# Patient Record
Sex: MALE | Race: WHITE | Employment: FULL TIME | URBAN - METROPOLITAN AREA
[De-identification: names, ages, dates, MRNs, and addresses within clinical notes are randomized per-mention and may not be internally consistent; named-entity substitution may affect disease eponyms.]

---

## 2020-02-26 ENCOUNTER — HOSPITAL ENCOUNTER (OUTPATIENT)
Dept: PHYSICAL THERAPY | Age: 30
Discharge: HOME | End: 2020-02-26
Payer: OTHER GOVERNMENT

## 2020-02-26 PROCEDURE — 97110 THERAPEUTIC EXERCISES: CPT | Performed by: PHYSICAL THERAPIST

## 2020-02-26 PROCEDURE — 97014 ELECTRIC STIMULATION THERAPY: CPT | Performed by: PHYSICAL THERAPIST

## 2020-02-26 PROCEDURE — 97161 PT EVAL LOW COMPLEX 20 MIN: CPT | Performed by: PHYSICAL THERAPIST

## 2020-02-26 NOTE — PROGRESS NOTES
PT INITIAL EVALUATION NOTE 2-15    Patient Name: Lois Golden  Date:2020  : 1990  [x]  Patient  Verified  Payor:  / Plan: [employer] [facility] [address] AND DEPENDENTS / Product Type:  /    In time:3:20 pm  Out time:4:30 pm  Total Treatment Time (min): 70  Visit #: 1     Treatment Area: Left leg pain [M79.605]    SUBJECTIVE  Pain Level (0-10 scale): 2/10  Any medication changes, allergies to medications, adverse drug reactions, diagnosis change, or new procedure performed?: [] No    [x] Yes (see summary sheet for update)  Subjective:     Playing soccer more than 1 year ago there was a partial ACL tear from trying to change directions. 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 It would give out going down stairs. He had a [facility]G ACL[facility]R 2020 at [facility] and is doing well per his report. He is only taking OTC NSAID and isn't taking pain medication any longer. Has been icing and WBAT. He is back to working because he works from home. He is able to sit on his computer.   PLOF: soccer, weight training, active lifestyle, [employer]ang[employer]  Mechanism of Injury: soccer  Previous Treatment/Compliance: strengthening at home  PMHx/Surgical Hx: no sig PMH per pt report  Work Hx: sales for [employer]  Living Situation: with wife in home with stairs  Pt Goals: to return to soccer  Barriers: none noted  Motivation: excellent  Substance use: none stated    FABQ Score: see FOTO   Cognition: A & O x 4        OBJECTIVE/EXAMINATION  Posture:  Stooped over crutches  Other Observations:  Bruising and edema L knee WNL for stage of condition 6 days s/p  Gait and Functional Mobility:  Brace L locked in ext; bilateral axillary crutches  Palpation: intracapsular and interstitial edema      AROM:         R   L  Flexion /DF   140   53  Extension/PF   0   0        MMT: quad tone poor on L, HS weakness noted  Neurological: Reflexes / Sensations: WNL sensation with some numbness around incision L knee  Special Tests:    Deferred secondary to surgery      Modality rationale: decrease edema, decrease inflammation, decrease pain, increase tissue extensibility and increase muscle contraction/control to improve the patients ability to ambulate without AD   Min Type Additional Details   15 [x] Estim: []Att   [x]Unatt        []TENS instruct                  [x]IFC  []Premod   [x]NMES                     []Other:  []w/US   [x]w/ice   []w/heat  Position: NMES to L quad for QS and IFC to L knee when icing  Location: L knee    []  Traction: [] Cervical       []Lumbar                       [] Prone          []Supine                       []Intermittent   []Continuous Lbs:  [] before manual  [] after manual  []w/heat    []  Ultrasound: []Continuous   [] Pulsed at:                            []1MHz   []3MHz Location:  W/cm2:    []  Paraffin         Location:  []w/heat    []  Ice     []  Heat  []  Ice massage Position:  Location:    []  Laser  []  Other: Position:  Location:    []  Vasopneumatic Device Pressure:       [] lo [] med [] hi   Temperature:    [x] Skin assessment post-treatment:  [x]intact [x]redness- no adverse reaction    []redness - adverse reaction:     15 min Therapeutic Exercise:  [x] See flow sheet :   Rationale: increase ROM, increase strength, improve coordination, improve balance and increase proprioception to improve the patients ability to return to N ambulation             With   [x] TE   [] TA   [] neuro   [] other: Patient Education: [x] Review HEP    [x] Progressed/Changed HEP based on:   [] positioning   [] body mechanics   [] transfers   [] heat/ice application    [] other:        Other Objective/Functional Measures:see FOTO     Pain Level (0-10 scale) post treatment: 1/10 following icing      ASSESSMENT:      [x]  See Plan of Care      Kailey Gonzalez, PT, DPT, [facility]  2/26/2020

## 2020-02-26 NOTE — PROGRESS NOTES
[address] [facility] [address]  Phone: [phone]  Fax: [phone]    Plan of Care/Statement of Necessity for Physical Therapy Services  2-15    Patient name: Oliverio Stpaleton  : 1990  Provider#: [id number]  Referral source: Sofía Herring MD      Medical/Treatment Diagnosis: Left leg pain [M79.605]     Prior Hospitalization: see medical history     Comorbidities: none stated  Prior Level of Function: soccer, [employer], work in sedentary job, exercising  Medications: Verified on Patient Summary List  Start of Care: 2020      Onset Date: 2020 surgical intervention    The Plan of Care and following information is based on the information from the initial evaluation. Assessment/ key information: Tao Mcnair presents to our office s/p L ACL-R by Counts include 234 beds at the [facility] and demonstrates limited ROM, strength, gait deviation, poor proprioception and limited ADL and IADL abilities. He will benefit from skilled PT prior to D/C to address the below and allow return to N ADL skills and N IADL skills without limitation. Evaluation Complexity History LOW Complexity : Zero comorbidities / personal factors that will impact the outcome / POC; Examination MEDIUM Complexity : 3 Standardized tests and measures addressing body structure, function, activity limitation and / or participation in recreation  ;Presentation MEDIUM Complexity : Evolving with changing characteristics  ; Clinical Decision Making MEDIUM Complexity : FOTO score of 26-74  Overall Complexity Rating: LOW     Problem List: pain affecting function, decrease ROM, decrease strength, edema affecting function, impaired gait/ balance, decrease ADL/ functional abilitiies, decrease activity tolerance, decrease flexibility/ joint mobility and decrease transfer abilities   Treatment Plan may include any combination of the following: Therapeutic exercise, Therapeutic activities, Neuromuscular re-education, Physical agent/modality, Gait/balance training, Manual therapy, Patient education, Functional mobility training and Stair training  Patient / Family readiness to learn indicated by: asking questions, trying to perform skills and interest  Persons(s) to be included in education: patient (P) and family support person (FSP);list wife  Barriers to Learning/Limitations: None  Patient Goal (s): to get back to soccer  Patient Self Reported Health Status: excellent  Rehabilitation Potential: excellent    Short Term Goals: To be accomplished in 8 treatments:  Pt will demo a min of 90 degrees of L knee flex  Pt will ambulate with brace and no AD without LOB for 200 ft  Pt will demo flex SLR with no quad lag to demonstrate strength to allow for ambulation without  Brace locking    Long Term Goals: To be accomplished in 24-32 treatments:  Pt will demo full Flex ROM L knee for allow for 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  Pt will demo running x a min of 10 minutes to allow for passing of [employer] 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  Long Term AR Goals  1)Patient will demonstrate Grade IV or Grade V Hruska Adduction Lift Score to allow for functional mobility in home and community settings  2)Pt will demonstrate the ability to ambulate forward and backward achieving bilateral Acetabular Femoral Internal Rotation for pain free mobility  3)Pt will demonstrate the ability to run without subjective complaints or gait deviation  4)Pt will demonstrate independence with discharge HEP to allow for ambulation, sitting and standing without objective dysfunction    Frequency / Duration: Patient to be seen 2 times per week for up to 32 treatments.     Patient/ Caregiver education and instruction: self care, activity modification, brace/ splint application and exercises    [x]  Plan of care has been reviewed with PTA        Certification Period: 90 days      Vin Elliott PT, DPT, [facility]    2/26/2020 ________________________________________________________________________    I certify that the above Therapy Services are being furnished while the patient is under my care. I agree with the treatment plan and certify that this therapy is necessary.     [de-identified] Signature:____________________  Date:____________Time: _________

## 2020-03-02 ENCOUNTER — HOSPITAL ENCOUNTER (OUTPATIENT)
Dept: PHYSICAL THERAPY | Age: 30
Discharge: HOME | End: 2020-03-02
Payer: OTHER GOVERNMENT

## 2020-03-02 PROCEDURE — 97014 ELECTRIC STIMULATION THERAPY: CPT | Performed by: PHYSICAL THERAPIST

## 2020-03-02 PROCEDURE — 97016 VASOPNEUMATIC DEVICE THERAPY: CPT | Performed by: PHYSICAL THERAPIST

## 2020-03-02 PROCEDURE — 97110 THERAPEUTIC EXERCISES: CPT | Performed by: PHYSICAL THERAPIST

## 2020-03-04 ENCOUNTER — HOSPITAL ENCOUNTER (OUTPATIENT)
Dept: PHYSICAL THERAPY | Age: 30
Discharge: HOME | End: 2020-03-04
Payer: OTHER GOVERNMENT

## 2020-03-04 PROCEDURE — 97016 VASOPNEUMATIC DEVICE THERAPY: CPT | Performed by: PHYSICAL THERAPIST

## 2020-03-04 PROCEDURE — 97110 THERAPEUTIC EXERCISES: CPT | Performed by: PHYSICAL THERAPIST

## 2020-03-04 PROCEDURE — 97014 ELECTRIC STIMULATION THERAPY: CPT | Performed by: PHYSICAL THERAPIST

## 2020-03-04 NOTE — PROGRESS NOTES
PT DAILY TREATMENT NOTE 2-15    Patient Name: Lucila Sandoval  Date:3/4/2020  : 1990  [x]  Patient  Verified  Payor:  / Plan: [employer] [address] AND DEPENDENTS / Product Type:  /    In time:11:05 AM  Out time:12:00  Total Treatment Time (min): 55  Visit #:  3    Treatment Area: Left leg pain [M79.605]    SUBJECTIVE  Pain Level (0-10 scale): 0/10  Any medication changes, allergies to medications, adverse drug reactions, diagnosis change, or new procedure performed?: [x] No    [] Yes (see summary sheet for update)  Subjective functional status/changes:   [] No changes reported  Minimal pain, but continues to c/o swelling especially suprapatellar area. OBJECTIVE    Modality rationale: decrease edema, decrease inflammation and increase muscle contraction/control to improve the patients ability to perform ADLs. Min Type Additional Details       [x] Estim: []Att   [x]Unatt    []TENS instruct                  []IFC  []Premod   [x]NMES                     []Other:  []w/US   []w/ice   []w/heat  Position:  Location:       []  Traction: [] Cervical       []Lumbar                       [] Prone          []Supine                       []Intermittent   []Continuous Lbs:  [] before manual  [] after manual  []w/heat    []  Ultrasound: []Continuous   [] Pulsed                       at: []1MHz   []3MHz Location:  W/cm2:    [] Paraffin         Location:   []w/heat    []  Ice     []  Heat  []  Ice massage Position:  Location:    []  Laser  []  Other: Position:  Location:      [x]  Vasopneumatic Device Pressure:       [x] lo [] med [] hi   Temperature:      [x] Skin assessment post-treatment:  [x]intact []redness- no adverse reaction    []redness - adverse reaction:         35 min Therapeutic Exercise:  [x] See flow sheet :   Rationale: increase ROM, increase strength and improve coordination to improve the patients ability to perform ADLs.             With   [x] TE   [] TA   [] neuro   [] other: Patient Education: [x] Review HEP    [] Progressed/Changed HEP based on:   [] positioning   [] body mechanics   [] transfers   [x] heat/ice application    [x] other: swelling management     Other Objective/Functional Measures:   Knee flexion ROM: 100 degrees     Pain Level (0-10 scale) post treatment: 0/10    ASSESSMENT/Changes in Function:   Patient progressing well with quad control and ROM  Patient will continue to benefit from skilled PT services to modify and progress therapeutic interventions, address functional mobility deficits, address ROM deficits, address strength deficits, analyze and address soft tissue restrictions, analyze and cue movement patterns, analyze and modify body mechanics/ergonomics and assess and modify postural abnormalities to attain remaining goals.      []  See Plan of Care  [x]  See progress note/recertification  []  See Discharge Summary    PLAN  [x]  Upgrade activities as tolerated     []  Continue plan of care  []  Update interventions per flow sheet       []  Discharge due to:_  []  Other:_      Maye ePrez, PT 3/4/2020

## 2020-03-04 NOTE — PROGRESS NOTES
[facility]  [place] [address] [facility] [address]  Phone: [phone]      Fax:  [phone]    Progress Note    Name: Vanessa Jefferson   : 1990   MD: Isra Cantu MD       Treatment Diagnosis: Left leg pain [M79.605]  Start of Care: 2020    Visits from Start of Care: 3  Missed Visits: 0    Summary of Care: Jeanne Turpin has progressed well with therapy with a focus on a gradual progression of quad strength and ROM along phase I of rehab protocol. ROM is currently at 100 degrees of flexion and 0 degrees of extension. Quad strength is returning nicely and he demonstrates no extensor lag. Assessment / Recommendations:        Short Term Goals: To be accomplished in 8 treatments:  Pt will demo a min of 90 degrees of L knee flex. Met. Pt will ambulate with brace and no AD without LOB for 200 ft  Met. Pt will demo flex SLR with no quad lag to demonstrate strength to allow for ambulation without  Brace locking  Met. Patient will continue to benefit from PT under the current plan of care with a focus on weaning from the brace and normalizing gait.       Zaki Lozada, PT 3/4/2020    ________________________________________________________________________  NOTE TO PHYSICIAN:  Please complete the following and fax to: [facility]: [phone]  . Retain this original for your records. If you are unable to process this request in 24 hours, please contact our office.        ____ I have read the above report and request that my patient continue therapy with the following changes/special instructions:  ____ I have read the above report and request that my patient be discharged from therapy    Physician's Signature:_________________ Date:___________Time:__________

## 2020-03-09 ENCOUNTER — APPOINTMENT (OUTPATIENT)
Dept: PHYSICAL THERAPY | Age: 30
End: 2020-03-09
Payer: OTHER GOVERNMENT

## 2020-03-10 ENCOUNTER — HOSPITAL ENCOUNTER (OUTPATIENT)
Dept: PHYSICAL THERAPY | Age: 30
Discharge: HOME | End: 2020-03-10
Payer: OTHER GOVERNMENT

## 2020-03-10 PROCEDURE — 97140 MANUAL THERAPY 1/> REGIONS: CPT | Performed by: PHYSICAL THERAPIST

## 2020-03-10 PROCEDURE — 97016 VASOPNEUMATIC DEVICE THERAPY: CPT | Performed by: PHYSICAL THERAPIST

## 2020-03-10 PROCEDURE — 97110 THERAPEUTIC EXERCISES: CPT | Performed by: PHYSICAL THERAPIST

## 2020-03-10 NOTE — PROGRESS NOTES
PT DAILY TREATMENT NOTE 2-15    Patient Name: Gracy Younger  Date:3/10/2020  : 1990  [x]  Patient  Verified  Payor:  / Plan: [employer] [facility] [address] AND DEPENDENTS / Product Type:  /    In time: 4:20 PM  Out time: 5:25 PM  Total Treatment Time (min): 65  Visit #:  4    Treatment Area: Left leg pain [M79.347]    SUBJECTIVE  Pain Level (0-10 scale): 0/10  Any medication changes, allergies to medications, adverse drug reactions, diagnosis change, or new procedure performed?: [x] No    [] Yes (see summary sheet for update)  Subjective functional status/changes:   [] No changes reported  Pt reports he noticed significant swelling in his L shin when he took off the brace  MD recommends knee locked in extension for one more week     OBJECTIVE    Modality rationale: decrease edema, decrease inflammation and increase muscle contraction/control to improve the patients ability to perform ADLs.    Min Type Additional Details      0 [x] Estim: []Att   [x]Unatt    []TENS instruct                  []IFC  []Premod   [x]NMES                     []Other:  []w/US   []w/ice   []w/heat  Position:  Location:       []  Traction: [] Cervical       []Lumbar                       [] Prone          []Supine                       []Intermittent   []Continuous Lbs:  [] before manual  [] after manual  []w/heat    []  Ultrasound: []Continuous   [] Pulsed                       at: []1MHz   []3MHz Location:  W/cm2:    [] Paraffin         Location:   []w/heat    []  Ice     []  Heat  []  Ice massage Position:  Location:    []  Laser  []  Other: Position:  Location:     15 [x]  Vasopneumatic Device Pressure:       [x] lo [] med [] hi   Temperature:      [x] Skin assessment post-treatment:  [x]intact []redness- no adverse reaction    []redness - adverse reaction:       10 min Manual Therapy: STM to L tib ant and MFR to gastroc/soleus (L)    Rationale: decrease pain, increase ROM, increase tissue extensibility, decrease edema  and decrease trigger points to improve the patients ability to sit, stand, transfer, ambulate, complete ADLs        40 min Therapeutic Exercise:  [x] See flow sheet :   Rationale: increase ROM, increase strength and improve coordination to improve the patients ability to sit, stand, transfer, ambulate, complete ADLs            With   [x] TE   [] TA   [] neuro   [] other: Patient Education: [x] Review HEP    [] Progressed/Changed HEP based on:   [] positioning   [] body mechanics   [] transfers   [x] heat/ice application    [x] other: swelling management     Other Objective/Functional Measures:   Excellent quad tone     Pain Level (0-10 scale) post treatment: 0/10    ASSESSMENT/Changes in Function:   Advanced to standing ther ex    Patient will continue to benefit from skilled PT services to modify and progress therapeutic interventions, address functional mobility deficits, address ROM deficits, address strength deficits, analyze and address soft tissue restrictions, analyze and cue movement patterns, analyze and modify body mechanics/ergonomics and assess and modify postural abnormalities to attain remaining goals.      []  See Plan of Care  []  See progress note/recertification  []  See Discharge Summary    PLAN  [x]  Upgrade activities as tolerated     [x]  Continue plan of care  [x]  Update interventions per flow sheet       []  Discharge due to:_  []  Other:_      Cathy Huston, PT 3/10/2020

## 2020-03-12 ENCOUNTER — HOSPITAL ENCOUNTER (OUTPATIENT)
Dept: PHYSICAL THERAPY | Age: 30
Discharge: HOME | End: 2020-03-12
Payer: OTHER GOVERNMENT

## 2020-03-12 PROCEDURE — 97110 THERAPEUTIC EXERCISES: CPT | Performed by: PHYSICAL THERAPIST

## 2020-03-12 PROCEDURE — 97016 VASOPNEUMATIC DEVICE THERAPY: CPT | Performed by: PHYSICAL THERAPIST

## 2020-03-13 NOTE — PROGRESS NOTES
PT DAILY TREATMENT NOTE 2-15    Patient Name: Vikki Maharaj  Date:3/13/2020  : 1990  [x]  Patient  Verified  Payor:  / Plan: [employer] [address] AND DEPENDENTS / Product Type:  /    In time:12:45 pm  Out time:1:45 pm  Total Treatment Time (min): 60  Visit #:  5    Treatment Area: Left leg pain [M47.244]    SUBJECTIVE  Pain Level (0-10 scale): 0/10  Any medication changes, allergies to medications, adverse drug reactions, diagnosis change, or new procedure performed?: [x] No    [] Yes (see summary sheet for update)  Subjective functional status/changes:   [] No changes reported  Knee cap feels stiff with bending.     OBJECTIVE    Modality rationale: decrease edema and decrease inflammation to improve the patients ability to perform ADLS   Min Type Additional Details       [] Estim: []Att   []Unatt    []TENS instruct                  []IFC  []Premod   []NMES                     []Other:  []w/US   []w/ice   []w/heat  Position:  Location:       []  Traction: [] Cervical       []Lumbar                       [] Prone          []Supine                       []Intermittent   []Continuous Lbs:  [] before manual  [] after manual  []w/heat    []  Ultrasound: []Continuous   [] Pulsed                       at: []1MHz   []3MHz Location:  W/cm2:    [] Paraffin         Location:   []w/heat    []  Ice     []  Heat  []  Ice massage Position:  Location:    []  Laser  []  Other: Position:  Location:   10   [x]  Vasopneumatic Device Pressure:       [x] lo [] med [] hi   Temperature: 34     [x] Skin assessment post-treatment:  [x]intact []redness- no adverse reaction    []redness - adverse reaction:         45 min Therapeutic Exercise:  [x] See flow sheet :   Rationale: increase ROM, increase strength and improve coordination to improve the patients ability to perform ADLs      5 min Manual Therapy:  Patellar mobs all planes   Rationale: decrease pain, increase ROM and increase tissue extensibility  to improve the patients ability to perform knee flexion in preparation for squatting activities              With   [x] TE   [] TA   [] neuro   [] other: Patient Education: [x] Review HEP    [] Progressed/Changed HEP based on:   [] positioning   [] body mechanics   [] transfers   [] heat/ice application    [] other:      Other Objective/Functional Measures: Left knee swelling = +1.5cm at mid and supra patella. Knee extension = 0, flexion = 110     Pain Level (0-10 scale) post treatment: 0/10    ASSESSMENT/Changes in Function:   Moderate hypomobility with patellar mobs, especially superiorly and inferiorly contributing to decreased knee flexion ROM and pain with quadriceps activation. Improved pain with SLR flexion after manual treatment. Patient will continue to benefit from skilled PT services to modify and progress therapeutic interventions, address functional mobility deficits, address ROM deficits, address strength deficits, analyze and address soft tissue restrictions, analyze and cue movement patterns and analyze and modify body mechanics/ergonomics to attain remaining goals. [x]  See Plan of Care  []  See progress note/recertification  []  See Discharge Summary         Progress towards goals / Updated goals:  Progressing well with knee flexion ROM and quad control.      PLAN  [x]  Upgrade activities as tolerated     [x]  Continue plan of care  [x]  Update interventions per flow sheet       []  Discharge due to:_  []  Other:_      Belkis Lamas, PT, DPT, OCS, CMTPT 3/13/2020

## 2020-03-16 ENCOUNTER — HOSPITAL ENCOUNTER (OUTPATIENT)
Dept: PHYSICAL THERAPY | Age: 30
Discharge: HOME | End: 2020-03-16
Payer: OTHER GOVERNMENT

## 2020-03-16 PROCEDURE — 97016 VASOPNEUMATIC DEVICE THERAPY: CPT

## 2020-03-16 PROCEDURE — 97110 THERAPEUTIC EXERCISES: CPT

## 2020-03-16 NOTE — PROGRESS NOTES
PT DAILY TREATMENT NOTE 2-15    Patient Name: Lucila Sandoval  Date:3/16/2020   : 1990   [x]  Patient  Verified  Payor:  / Plan: [employer] [facility] [address] AND DEPENDENTS / Product Type: [employer] Le /    In time:1:30 pm  Out time: 2:30 pm  Total Treatment Time (min): 60  Visit #:  6    Treatment Area: Left leg pain [M88.270]    SUBJECTIVE  Pain Level (0-10 scale): 0/10  Any medication changes, allergies to medications, adverse drug reactions, diagnosis change, or new procedure performed?: [x] No    [] Yes (see summary sheet for update)  Subjective functional status/changes:   [] No changes reported  Feeling less stiff.     OBJECTIVE    Modality rationale: decrease edema and decrease inflammation to improve the patients ability to perform ADLs   Min Type Additional Details       [] Estim: []Att   []Unatt    []TENS instruct                  []IFC  []Premod   []NMES                     []Other:  []w/US   []w/ice   []w/heat  Position:  Location:       []  Traction: [] Cervical       []Lumbar                       [] Prone          []Supine                       []Intermittent   []Continuous Lbs:  [] before manual  [] after manual  []w/heat    []  Ultrasound: []Continuous   [] Pulsed                       at: []1MHz   []3MHz Location:  W/cm2:    [] Paraffin         Location:   []w/heat    []  Ice     []  Heat  []  Ice massage Position:  Location:    []  Laser  []  Other: Position:  Location:   10   [x]  Vasopneumatic Device Pressure:       [] lo [x] med [] hi   Temperature: 34     [x] Skin assessment post-treatment:  [x]intact []redness- no adverse reaction    []redness - adverse reaction:         45 min Therapeutic Exercise:  [x] See flow sheet :   Rationale: increase ROM, increase strength and improve coordination to improve the patients ability to perform ADLs      5 min Manual Therapy:  Patellar mobs all planes   Rationale: decrease pain, increase ROM and increase tissue extensibility  to improve the patients ability to perform knee flexion in preparation for squatting activities              With   [x] TE   [] TA   [] neuro   [] other: Patient Education: [x] Review HEP    [] Progressed/Changed HEP based on:   [] positioning   [] body mechanics   [] transfers   [] heat/ice application    [] other:      Other Objective/Functional Measures:  Knee extension = 0, flexion = 120     Pain Level (0-10 scale) post treatment: 0/10     ASSESSMENT/Changes in Function:   Able to progress closed chain therex today (ie mini squats, step ups) with c/o increased discomfort. Patient will continue to benefit from skilled PT services to modify and progress therapeutic interventions, address functional mobility deficits, address ROM deficits, address strength deficits, analyze and address soft tissue restrictions, analyze and cue movement patterns and analyze and modify body mechanics/ergonomics to attain remaining goals. [x]  See Plan of Care  []  See progress note/recertification  []  See Discharge Summary         Progress towards goals / Updated goals:  Continues to progress well with knee ROM and quad control.      PLAN  [x]  Upgrade activities as tolerated     [x]  Continue plan of care  [x]  Update interventions per flow sheet       []  Discharge due to:_  []  Other:_      Sarah Najera, PT, DPT, OCS, CMTPT 3/16/2020

## 2020-03-19 ENCOUNTER — HOSPITAL ENCOUNTER (OUTPATIENT)
Dept: PHYSICAL THERAPY | Age: 30
Discharge: HOME | End: 2020-03-19
Payer: OTHER GOVERNMENT

## 2020-03-19 PROCEDURE — 97110 THERAPEUTIC EXERCISES: CPT

## 2020-03-19 PROCEDURE — 97016 VASOPNEUMATIC DEVICE THERAPY: CPT

## 2020-03-19 PROCEDURE — 97140 MANUAL THERAPY 1/> REGIONS: CPT

## 2020-03-19 NOTE — PROGRESS NOTES
PT DAILY TREATMENT NOTE 2-15    Patient Name: John Senior  Date:3/19/2020   : 1990   [x]  Patient  Verified  Payor: [facility] / Plan: [employer] [facility] [address] AND DEPENDENTS / Product Type: [employer] /    In time:9:00 am  Out time: 10:20 am  Total Treatment Time (min): 80  Visit #:  7    Treatment Area: Left leg pain [M15.526]    SUBJECTIVE  Pain Level (0-10 scale): 0/10  Any medication changes, allergies to medications, adverse drug reactions, diagnosis change, or new procedure performed?: [x] No    [] Yes (see summary sheet for update)  Subjective functional status/changes:   [] No changes reported  Continues to feel better, less stiff. OBJECTIVE    Modality rationale: decrease edema and decrease inflammation to improve the patients ability to perform ADLs   Min Type Additional Details       [] Estim: []Att   []Unatt    []TENS instruct                  []IFC  []Premod   []NMES                     []Other:  []w/US   []w/ice   []w/heat  Position:  Location:       []  Traction: [] Cervical       []Lumbar                       [] Prone          []Supine                       []Intermittent   []Continuous Lbs:  [] before manual  [] after manual  []w/heat    []  Ultrasound: []Continuous   [] Pulsed                       at: []1MHz   []3MHz Location:  W/cm2:    [] Paraffin         Location:   []w/heat    []  Ice     []  Heat  []  Ice massage Position:  Location:    []  Laser  []  Other: Position:  Location:   10   [x]  Vasopneumatic Device Pressure:       [] lo [x] med [] hi   Temperature: 34     [x] Skin assessment post-treatment:  [x]intact []redness- no adverse reaction    []redness - adverse reaction:         60 min Therapeutic Exercise:  [x] See flow sheet :   Rationale: increase ROM, increase strength and improve coordination to improve the patients ability to ambulate with proper gait mechanics      10 min Manual Therapy:  Patellar mobs all planes. PROM extension. Attempted prone quad stretch. Rationale: decrease pain, increase ROM and increase tissue extensibility  to improve the patients ability to perform knee flexion in preparation for squatting activities              With   [x] TE   [] TA   [] neuro   [] other: Patient Education: [x] Review HEP    [] Progressed/Changed HEP based on:   [] positioning   [] body mechanics   [] transfers   [] heat/ice application    [] other:      Other Objective/Functional Measures:  Knee extension = 0, flexion = 125    Pain Level (0-10 scale) post treatment: 0/10     ASSESSMENT/Changes in Function:   Continues to gradually progress with knee flexion ROM. Progressed with SL dips, standing hamstring curls without c/o increased discomfort. Unable to attain quadriceps stretch secondary to decreased knee flexion ROM. Patient will continue to benefit from skilled PT services to modify and progress therapeutic interventions, address functional mobility deficits, address ROM deficits, address strength deficits, analyze and address soft tissue restrictions, analyze and cue movement patterns and analyze and modify body mechanics/ergonomics to attain remaining goals. [x]  See Plan of Care  []  See progress note/recertification  []  See Discharge Summary         Progress towards goals / Updated goals:  Continues to progress well with knee ROM and quad control.      PLAN  [x]  Upgrade activities as tolerated     [x]  Continue plan of care  [x]  Update interventions per flow sheet       []  Discharge due to:_  []  Other:_      Belkis Lamas, PT, DPT, OCS, CMTPT 3/19/2020

## 2020-03-23 ENCOUNTER — HOSPITAL ENCOUNTER (OUTPATIENT)
Dept: PHYSICAL THERAPY | Age: 30
Discharge: HOME | End: 2020-03-23
Payer: OTHER GOVERNMENT

## 2020-03-23 PROCEDURE — 97140 MANUAL THERAPY 1/> REGIONS: CPT

## 2020-03-23 PROCEDURE — 97016 VASOPNEUMATIC DEVICE THERAPY: CPT

## 2020-03-23 PROCEDURE — 97110 THERAPEUTIC EXERCISES: CPT

## 2020-03-23 NOTE — PROGRESS NOTES
PT DAILY TREATMENT NOTE 2-15    Patient Name: Juliet Ackerman  Date:3/23/2020   : 1990   [x]  Patient  Verified  Payor:  / Plan: [employer] [facility] [address] AND DEPENDENTS / Product Type:  /    In time:1:25 pm  Out time: 2:55 am  Total Treatment Time (min): 80  Visit #:  8    Treatment Area: Left leg pain [V64.069]    SUBJECTIVE  Pain Level (0-10 scale): 0/10  Any medication changes, allergies to medications, adverse drug reactions, diagnosis change, or new procedure performed?: [x] No    [] Yes (see summary sheet for update)  Subjective functional status/changes:   [] No changes reported  Has been using bike at home, thinks it is really helping with knee ROM.      OBJECTIVE    Modality rationale: decrease edema and decrease inflammation to improve the patients ability to perform ADLs   Min Type Additional Details       [] Estim: []Att   []Unatt    []TENS instruct                  []IFC  []Premod   []NMES                     []Other:  []w/US   []w/ice   []w/heat  Position:  Location:       []  Traction: [] Cervical       []Lumbar                       [] Prone          []Supine                       []Intermittent   []Continuous Lbs:  [] before manual  [] after manual  []w/heat    []  Ultrasound: []Continuous   [] Pulsed                       at: []1MHz   []3MHz Location:  W/cm2:    [] Paraffin         Location:   []w/heat    []  Ice     []  Heat  []  Ice massage Position:  Location:    []  Laser  []  Other: Position:  Location:   15   [x]  Vasopneumatic Device, 5 mins for set up/take down Pressure:       [x] lo [] med [] hi   Temperature: 34     [x] Skin assessment post-treatment:  [x]intact []redness- no adverse reaction    []redness - adverse reaction:         60 min Therapeutic Exercise:  [x] See flow sheet :   Rationale: increase ROM, increase strength and improve coordination to improve the patients ability to ambulate with proper gait mechanics      10 min Manual Therapy:  Patellar mobs all planes. PROM extension. Attempted prone quad stretch. Rationale: decrease pain, increase ROM and increase tissue extensibility  to improve the patients ability to perform knee flexion in preparation for squatting activities              With   [x] TE   [] TA   [] neuro   [] other: Patient Education: [x] Review HEP    [x] Progressed/Changed HEP based on:   [] positioning   [] body mechanics   [] transfers   [x] heat/ice application    [] other:      Other Objective/Functional Measures:  Knee extension = 0, flexion = 130    Pain Level (0-10 scale) post treatment: 0/10     ASSESSMENT/Changes in Function:   Reviewed HEP as pt is going to continue on his own secondary to Covid-19. Patient demonstrates knowledge of HEP, progressions. Tolerated treatment session well without c/o increased discomfort. Patient will continue to benefit from skilled PT services to modify and progress therapeutic interventions, address functional mobility deficits, address ROM deficits, address strength deficits, analyze and address soft tissue restrictions, analyze and cue movement patterns and analyze and modify body mechanics/ergonomics to attain remaining goals. [x]  See Plan of Care  []  See progress note/recertification  []  See Discharge Summary         Progress towards goals / Updated goals:  Continues to progress well with knee ROM and quad control. PLAN  [x]  Upgrade activities as tolerated     [x]  Continue plan of care  [x]  Update interventions per flow sheet       []  Discharge due to:_  [x]  Other: Pt is going to continue on his own secondary to Covid-19. Will call to check in as needed.      Esau Simmons, PT, DPT, OCS, CMTPT 3/23/2020

## 2020-03-26 ENCOUNTER — APPOINTMENT (OUTPATIENT)
Dept: PHYSICAL THERAPY | Age: 30
End: 2020-03-26
Payer: OTHER GOVERNMENT

## 2020-03-30 ENCOUNTER — APPOINTMENT (OUTPATIENT)
Dept: PHYSICAL THERAPY | Age: 30
End: 2020-03-30
Payer: OTHER GOVERNMENT

## 2020-04-02 ENCOUNTER — APPOINTMENT (OUTPATIENT)
Dept: PHYSICAL THERAPY | Age: 30
End: 2020-04-02

## 2020-04-06 ENCOUNTER — APPOINTMENT (OUTPATIENT)
Dept: PHYSICAL THERAPY | Age: 30
End: 2020-04-06

## 2020-04-09 ENCOUNTER — APPOINTMENT (OUTPATIENT)
Dept: PHYSICAL THERAPY | Age: 30
End: 2020-04-09

## 2020-04-13 ENCOUNTER — APPOINTMENT (OUTPATIENT)
Dept: PHYSICAL THERAPY | Age: 30
End: 2020-04-13

## 2020-04-16 ENCOUNTER — APPOINTMENT (OUTPATIENT)
Dept: PHYSICAL THERAPY | Age: 30
End: 2020-04-16

## 2020-07-14 NOTE — PROGRESS NOTES
[address] [facility] Eugen[address]  Phone: [phone]  Fax: [phone]    Discharge Summary  2-15    Patient name: Rubin Shah  : 1990  Provider#: [id number]  Referral source: Barbie Boyd MD      Medical/Treatment Diagnosis: Left leg pain [M79.605]     Prior Hospitalization: see medical history     Comorbidities: See Plan of Care  Prior Level of Function:See Plan of Care  Medications: Verified on Patient Summary List    Start of Care: 2020      Onset Date:2020   Visits from Start of Care: 8     Missed Visits: 1  Reporting Period : 20 to 3/23/20      ASSESSMENT/SUMMARY OF CARE: Pt responded well to PT treatment and was gradually progressing towards goals. Discontinued therapy due to Covid 19 pandemic. Pt presented with nearly full L knee ROM on last treatment session and was progressing nicely with LE strengthening therex. Was given HEP in order to continue progressing ROM and LE strength on his own. Think pt will continue to progress well on his own with HEP.       RECOMMENDATIONS:  [x]Discontinue therapy: [x]Patient has reached or is progressing toward set goals      []Patient is non-compliant or has abdicated      []Due to lack of appreciable progress towards set goals      []Other    Dianelys Christopher , PT, DPT, OCS, CMTPT 2020

## 2022-01-13 ENCOUNTER — TRANSCRIBE ORDER (OUTPATIENT)
Dept: SCHEDULING | Age: 32
End: 2022-01-13

## 2022-01-13 DIAGNOSIS — N63.10 BREAST MASS, RIGHT: Primary | ICD-10-CM

## 2022-01-14 ENCOUNTER — HOSPITAL ENCOUNTER (OUTPATIENT)
Dept: MAMMOGRAPHY | Age: 32
Discharge: HOME | End: 2022-01-14
Attending: INTERNAL MEDICINE
Payer: OTHER GOVERNMENT

## 2022-01-14 DIAGNOSIS — N63.10 BREAST MASS, RIGHT: ICD-10-CM

## 2022-01-14 PROCEDURE — 77066 DX MAMMO INCL CAD BI: CPT

## 2022-01-14 PROCEDURE — 76642 ULTRASOUND BREAST LIMITED: CPT

## 2022-08-17 ENCOUNTER — HOSPITAL ENCOUNTER (EMERGENCY)
Age: 32
Discharge: HOME | End: 2022-08-17
Attending: STUDENT IN AN ORGANIZED HEALTH CARE EDUCATION/TRAINING PROGRAM
Payer: COMMERCIAL

## 2022-08-17 ENCOUNTER — APPOINTMENT (OUTPATIENT)
Dept: CT IMAGING | Age: 32
End: 2022-08-17
Attending: EMERGENCY MEDICINE
Payer: COMMERCIAL

## 2022-08-17 VITALS
HEART RATE: 53 BPM | OXYGEN SATURATION: 99 % | RESPIRATION RATE: 16 BRPM | TEMPERATURE: 98.1 F | DIASTOLIC BLOOD PRESSURE: 75 MMHG | SYSTOLIC BLOOD PRESSURE: 126 MMHG

## 2022-08-17 DIAGNOSIS — S16.1XXA STRAIN OF NECK MUSCLE, INITIAL ENCOUNTER: ICD-10-CM

## 2022-08-17 DIAGNOSIS — R55 SYNCOPE AND COLLAPSE: Primary | ICD-10-CM

## 2022-08-17 DIAGNOSIS — S09.90XA MINOR HEAD INJURY, INITIAL ENCOUNTER: ICD-10-CM

## 2022-08-17 LAB
ALBUMIN SERPL-MCNC: 3.9 G/DL (ref 3.5–5)
ALBUMIN/GLOB SERPL: 1 {RATIO} (ref 1.1–2.2)
ALP SERPL-CCNC: 61 U/L (ref 45–117)
ALT SERPL-CCNC: 33 U/L (ref 12–78)
ANION GAP SERPL CALC-SCNC: 6 MMOL/L (ref 5–15)
AST SERPL-CCNC: 22 U/L (ref 15–37)
BASOPHILS # BLD: 0 K/UL (ref 0–0.1)
BASOPHILS NFR BLD: 0 % (ref 0–1)
BILIRUB SERPL-MCNC: 0.4 MG/DL (ref 0.2–1)
BUN SERPL-MCNC: 22 MG/DL (ref 6–20)
BUN/CREAT SERPL: 19 (ref 12–20)
CALCIUM SERPL-MCNC: 9.3 MG/DL (ref 8.5–10.1)
CHLORIDE SERPL-SCNC: 106 MMOL/L (ref 97–108)
CO2 SERPL-SCNC: 28 MMOL/L (ref 21–32)
CREAT SERPL-MCNC: 1.15 MG/DL (ref 0.7–1.3)
DIFFERENTIAL METHOD BLD: NORMAL
EOSINOPHIL # BLD: 0.1 K/UL (ref 0–0.4)
EOSINOPHIL NFR BLD: 1 % (ref 0–7)
ERYTHROCYTE [DISTWIDTH] IN BLOOD BY AUTOMATED COUNT: 11.9 % (ref 11.5–14.5)
GLOBULIN SER CALC-MCNC: 3.8 G/DL (ref 2–4)
GLUCOSE SERPL-MCNC: 104 MG/DL (ref 65–100)
HCT VFR BLD AUTO: 44 % (ref 36.6–50.3)
HGB BLD-MCNC: 15 G/DL (ref 12.1–17)
IMM GRANULOCYTES # BLD AUTO: 0 K/UL (ref 0–0.04)
IMM GRANULOCYTES NFR BLD AUTO: 0 % (ref 0–0.5)
LYMPHOCYTES # BLD: 1.9 K/UL (ref 0.8–3.5)
LYMPHOCYTES NFR BLD: 22 % (ref 12–49)
MCH RBC QN AUTO: 31.8 PG (ref 26–34)
MCHC RBC AUTO-ENTMCNC: 34.1 G/DL (ref 30–36.5)
MCV RBC AUTO: 93.2 FL (ref 80–99)
MONOCYTES # BLD: 0.5 K/UL (ref 0–1)
MONOCYTES NFR BLD: 6 % (ref 5–13)
NEUTS SEG # BLD: 5.9 K/UL (ref 1.8–8)
NEUTS SEG NFR BLD: 71 % (ref 32–75)
NRBC # BLD: 0 K/UL (ref 0–0.01)
NRBC BLD-RTO: 0 PER 100 WBC
PLATELET # BLD AUTO: 319 K/UL (ref 150–400)
PMV BLD AUTO: 8.9 FL (ref 8.9–12.9)
POTASSIUM SERPL-SCNC: 4.2 MMOL/L (ref 3.5–5.1)
PROCALCITONIN SERPL-MCNC: <0.05 NG/ML
PROT SERPL-MCNC: 7.7 G/DL (ref 6.4–8.2)
RBC # BLD AUTO: 4.72 M/UL (ref 4.1–5.7)
SARS-COV-2, COV2: NORMAL
SARS-COV-2, XPLCVT: NOT DETECTED
SODIUM SERPL-SCNC: 140 MMOL/L (ref 136–145)
SOURCE, COVRS: NORMAL
WBC # BLD AUTO: 8.4 K/UL (ref 4.1–11.1)

## 2022-08-17 PROCEDURE — 36415 COLL VENOUS BLD VENIPUNCTURE: CPT

## 2022-08-17 PROCEDURE — U0005 INFEC AGEN DETEC AMPLI PROBE: HCPCS

## 2022-08-17 PROCEDURE — 99284 EMERGENCY DEPT VISIT MOD MDM: CPT

## 2022-08-17 PROCEDURE — 85025 COMPLETE CBC W/AUTO DIFF WBC: CPT

## 2022-08-17 PROCEDURE — 84145 PROCALCITONIN (PCT): CPT

## 2022-08-17 PROCEDURE — 70450 CT HEAD/BRAIN W/O DYE: CPT

## 2022-08-17 PROCEDURE — 80053 COMPREHEN METABOLIC PANEL: CPT

## 2022-08-17 PROCEDURE — 72125 CT NECK SPINE W/O DYE: CPT

## 2022-08-17 NOTE — Clinical Note
Janell Marie was seen and treated in our emergency department on 8/17/2022. Janell Marie cannot return to work until 8/23/2022.     Ariel Mckoy, DO

## 2022-08-17 NOTE — ED PROVIDER NOTES
Head Injury   Associated symptoms include headaches and neck pain. Pertinent negatives include no chest pain, no visual disturbance, no abdominal pain, no nausea, no vomiting, no light-headedness and no cough. Patient is a 66-year-old male who presents to the ED stating that he had a syncopal and collapse episode while at work yesterday near [place]. He is a  and was alone in the station when the incident happened. He was evaluated yesterday at [facility] but states that they did a EKG and franca blood which was not sent off. He states he was discharged home with vasovagal syncope instructions. He continues to have persistent headache and neck pain. Hand eye coordination is intact, normal reflexes, normal gait. He denies any active vomiting. Denies fever, cold symptoms, visual changes, focal weakness or rash. Denies any difficulty breathing, difficulty swallowing, SOB or chest pain. He has not had any pain medications today prior to arrival.        No past medical history on file. No past surgical history on file. No family history on file.     Social History     Socioeconomic History    Marital status: SINGLE     Spouse name: Not on file    Number of children: Not on file    Years of education: Not on file    Highest education level: Not on file   Occupational History    Not on file   Tobacco Use    Smoking status: Not on file    Smokeless tobacco: Not on file   Substance and Sexual Activity    Alcohol use: Not on file    Drug use: Not on file    Sexual activity: Not on file   Other Topics Concern    Not on file   Social History Narrative    Not on file     Social Determinants of Health     Financial Resource Strain: Not on file   Food Insecurity: Not on file   Transportation Needs: Not on file   Physical Activity: Not on file   Stress: Not on file   Social Connections: Not on file   Intimate Partner Violence: Not on file   Housing Stability: Not on file         ALLERGIES: Patient has no known allergies. Review of Systems   Constitutional:  Negative for activity change, appetite change, fever and unexpected weight change. HENT:  Negative for congestion, sore throat and trouble swallowing. Eyes:  Negative for visual disturbance. Respiratory:  Negative for cough and shortness of breath. Cardiovascular:  Negative for chest pain, palpitations and leg swelling. Gastrointestinal:  Negative for abdominal pain, diarrhea, nausea and vomiting. Genitourinary:  Negative for dysuria. Musculoskeletal:  Positive for neck pain. Negative for back pain. Neurological:  Positive for headaches. Negative for dizziness and light-headedness. All other systems reviewed and are negative. Vitals:    08/17/22 1552   BP: (!) 166/72   Pulse: (!) 57   Resp: 16   Temp: 98.3 °F (36.8 °C)   SpO2: 99%            Physical Exam  Vitals and nursing note reviewed. Constitutional:       General: He is not in acute distress. Appearance: Normal appearance. He is not ill-appearing, toxic-appearing or diaphoretic. Comments: White male, non-smoker,    HENT:      Head: Normocephalic. Mouth/Throat:      Mouth: Mucous membranes are moist.   Eyes:      Extraocular Movements: Extraocular movements intact. Conjunctiva/sclera: Conjunctivae normal.      Pupils: Pupils are equal, round, and reactive to light. Comments: No nystagmus   Neck:      Comments: Reports some posterior lateral neck soreness with active range of motion of the head neck; Skin integrity is intact. There is no obvious bony or soft tissue deformity. Good neurovascular sensation. No apparent tendon or nerve injury. Cardiovascular:      Rate and Rhythm: Normal rate and regular rhythm. Pulmonary:      Effort: Pulmonary effort is normal.      Breath sounds: Normal breath sounds. Abdominal:      Palpations: Abdomen is soft. Tenderness: There is no abdominal tenderness.  There is no guarding or rebound. Hernia: No hernia is present. Musculoskeletal:         General: Normal range of motion. Cervical back: Normal range of motion and neck supple. Tenderness present. Right lower leg: No edema. Left lower leg: No edema. Skin:     General: Skin is warm and dry. Findings: No bruising, erythema or rash. Neurological:      General: No focal deficit present. Mental Status: He is alert and oriented to person, place, and time. MDM         Procedures    CT HEAD WO CONT    Result Date: 8/17/2022  No acute intracranial abnormality. CT SPINE CERV WO CONT    Result Date: 8/17/2022  1. No acute abnormality     Labs Reviewed   METABOLIC PANEL, COMPREHENSIVE - Abnormal; Notable for the following components:       Result Value    Glucose 104 (*)     BUN 22 (*)     A-G Ratio 1.0 (*)     All other components within normal limits   SARS-COV-2   CBC WITH AUTOMATED DIFF   PROCALCITONIN   SARS-COV-2     Patient has been reexamined and denies any other complaints of pain or discomfort. Recommend resting at home for several days, Tylenol or Motrin as needed for headache or neck pain. Close follow-up with the neurology clinic if symptoms persist.    5:45 PM  Patient's results and plan of care have been reviewed with him. Patient and/or family have verbally conveyed their understanding and agreement of the patient's signs, symptoms, diagnosis, treatment and prognosis and additionally agree to follow up as recommended or return to the Emergency Room should his condition change prior to follow-up. Discharge instructions have also been provided to the patient with some educational information regarding his diagnosis as well a list of reasons why he would want to return to the ER prior to his follow-up appointment should his condition change. Bo Manning NP

## 2022-08-17 NOTE — ED TRIAGE NOTES
Triage: Pt arrives ambulatory from home with CC of neck pain and headache. He reports yesterday he had a syncopal episode in [place]. He was taken to [facility] and evaluated for the syncope however he has headache which he believes is secondary to the fall and doesn't feel like that was worked up. No imaging was obtained at [facility].

## 2022-09-21 ENCOUNTER — HOSPITAL ENCOUNTER (OUTPATIENT)
Dept: PHYSICAL THERAPY | Age: 32
Discharge: HOME | End: 2022-09-21
Payer: COMMERCIAL

## 2022-09-21 PROCEDURE — 97162 PT EVAL MOD COMPLEX 30 MIN: CPT | Performed by: PHYSICAL THERAPIST

## 2022-09-21 PROCEDURE — 97535 SELF CARE MNGMENT TRAINING: CPT | Performed by: PHYSICAL THERAPIST

## 2022-09-21 NOTE — PROGRESS NOTES
PT INITIAL EVALUATION NOTE 2-15    Patient Name: Emma Bernardo  Date:2022  : 1990  [x]  Patient  Verified  Payor: Ulises Patel / Plan: [employer] PPO / Product Type: PPO /    In time:1220 pm  Out time:115 pm  Total Treatment Time (min): 55  Visit #: 1     Treatment Area: Other low back pain [M54.59]    SUBJECTIVE  Pain Level (0-10 scale): 4/10  Any medication changes, allergies to medications, adverse drug reactions, diagnosis change, or new procedure performed?: [] No    [x] Yes (see summary sheet for update)  Subjective:     January began to have L shoulder blade and thoracic pain. This worsened and he began to have an increase in anxiety and they had to leave DC and return home in the middle of the night. Something similar to this has happened a few times at work. He is presyncopal a few times. He did have a vasovagal incident when at work in August. He does have a lot of times where he has tingling and pins and needles and feels like he is suffocating. He has seen a neuro NP that cleared him neurally. 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 Has not seen a cardiologist. Did have EKG that was normal. He did have an US of his heart that might have had some leakage in the Pulmonary artery. His wife is pregnant and will be due to deliver in December. Did monitor his BP and HR following his vagal symptoms - his BP was elevated some and his HR was slightly elevated. Does not currently monitor this daily. Current body weight 5-10 lb greater. Has felt a difference in his circulatory sensation bilateral LE. Feels his blood sugar is at a normal level. No glasses or contacts. No implants or crowns. No dental device. No orthotics in shoes. Wears Crossfit type shoes. When running will wear NB shoes. Doesn't like a lot of cushion. Last time he felt like he was having symptoms was  when he got pain in his shoulder blade that then gave him a headache and this did not reduce until he went to sleep.  Did have elevated HR and did have difficulty breathing. Did not have previous vasovagal symptoms.    PLOF: soccer, lifting weights, working as ; does feel better after working out  Mechanism of Injury: insidious onset of neck pain  Previous Treatment/Compliance: massage therapy; dry needling in neck recently with mixed results   PMHx/Surgical Hx:  L ACL-R, c-spine DDD  Work Hx: not currently working as a  - went back in to work and he is having anxiety and symptoms regarding when this might happen again at work; he is working with a EMDR therapist and has had 3 sessions - will have another this coming week; 3, 24 hour shifts every 9 days; getting paid via PTO currently - not on short or long term disability; works out for 1 hour while at [facility] [employer] - does not have to be formally evaluated by his position   Living Situation: with wife in home with stairs  Pt Goals: to reduce symptoms and return to work  Barriers: potential anxiety condition complicating physiologic symptoms   Motivation: excellent  Substance use: none stated    Cognition: A & O x 4        OBJECTIVE/EXAMINATION  Postural Restoration [place] (AR) Evaluation    Posture: bilateral hip abducted position; rounded and forward shoulders  Other Observations: pt has a lot of muscular development     Seated: L heel, bilateral ischial tuberosities, L post teeth and L visual gaze with significant dizziness present  R heel, bilateral ischial tuberosities, R post teeth and L visual gaze with mod dizziness present             Left   Right  Standing  Standing Reach Test (M)    2 inches (able to go to top of toes)     Functional Squat Test  (P)    Level 1-2; has difficulty maintainting ZOA       Sitting  FA IR R.O.M. (M)     28 degrees  42 degrees  FA ER R.O.M.  (M)     34 degrees  32 degrees      Sidelying  Adduction Drop Test (M)    +   +  Pelvic [place] Drop Test (PADT) (P)  +   +      Supine  Apical Expansion (R)     Significantly limited bilaterally     Shoulder Flexion (R)     115 degrees  110 degrees  HG IR (R)      90 degrees  60 degrees  Subclavius Flexibility (R)    X limited  X limited  Elevated and Externally Rotated Ant. Ribs (R) L > R     Horizontal Abduction (R)    30 degrees  45 degrees      CERVICAL-CRANIO-MANDIBULAR  Cervical Axial Rotation    45 degrees  60 degrees  Cervical Side Bend     Sig limited     Mandibular Opening     30 mm  Mandibular Lateral Trusion with Protrusion  With L vision is dizziness W L vision is dizziness  Cervical Extension     Very limited, not flexible      When occluding post teeth on either side with conjugate gaze, pt is dizzy.  When conjugate gazing with protrusion with lateral trusion there is greater dizziness with vision to L and trusion to R.        15 min Neuromuscular Re-education:  [x]  See flow sheet :   Rationale: increase ROM, increase strength, improve coordination, improve balance, and increase proprioception  to improve the patients ability to reduce vagal episodes in work and home environments            With   [] TE   [] TA   [] Neuro   [] SC   [] other: Patient Education: [x] Review HEP    [] Progressed/Changed HEP based on:   [] positioning   [] body mechanics   [] transfers   [] heat/ice application    [] other:        Other Objective/Functional Measures: TUG              WNL    Pain Level (0-10 scale) post treatment: 'the same'      ASSESSMENT:      [x]  See Plan of Care      Justin Gates, PT, DPT, [facility]    9/21/2022

## 2022-09-21 NOTE — THERAPY EVALUATION
Physical Therapy at [facility],   a part of [address]  [place]  [facility]  [place]  Phone: [phone]  Fax: [phone]    Plan of Care/ Statement of Necessity for Physical Therapy Services 2-15    Patient name: Tata Venegas  : 1990  Provider#: [id number]  Referral source: Pinky Sow NP      Medical/Treatment Diagnosis: Other low back pain [M54.59]     Prior Hospitalization: see medical history     Comorbidities: low testosterone, cervical DDD C5-7 with radiculopathy  Prior Level of Function: work as , [employer], cardio, home care   Medications: Verified on Patient Summary List    Start of Care: 2022      Onset Date: several weeks       The Plan of Care and following information is based on the information from the initial evaluation. Assessment/ key information: [facility] presents to our office with syncopal episodes, neck pain, thoracic pain and restriction, limited capability in home care and inability to work secondary to current condition. The patient's current condition affects his capability to earn money with work as well as his ability to work out as required by his job. He demonstrates non-patho PEC patterning per AR objective measurements classification. His condition is complicated by presence of anxiety regarding current condition. The patient will benefit from skilled PT prior to D/C to address neuromuscular postural influences of functioning in ADL and IADL skills. Evaluation Complexity History MEDIUM  Complexity : 1-2 comorbidities / personal factors will impact the outcome/ POC ; Examination HIGH Complexity : 4+ Standardized tests and measures addressing body structure, function, activity limitation and / or participation in recreation  ;Presentation MEDIUM Complexity : Evolving with changing characteristics  ; Clinical Decision Making Other outcome measures TUG  LOW   Overall Complexity Rating: LOW     Problem List: pain affecting function, decrease ROM, impaired gait/ balance, decrease ADL/ functional abilitiies, decrease activity tolerance, decrease flexibility/ joint mobility, and decrease transfer abilities   Treatment Plan may include any combination of the following: Therapeutic exercise, Therapeutic activities, Neuromuscular re-education, Physical agent/modality, Gait/balance training, Manual therapy, Patient education, Self Care training, Functional mobility training, Home safety training, Stair training, and Other: AR visual integration  Patient / Family readiness to learn indicated by: asking questions, trying to perform skills, and interest  Persons(s) to be included in education: patient (P)  Barriers to Learning/Limitations: None  Patient Goal (s): to be able to go back to work and have less symptoms  Patient Self Reported Health Status: good  Rehabilitation Potential: good    Short Term Goals: To be accomplished in 8 treatments:  1) Patient will demonstrate Negative Hruska Adduction Drop Test   2) Patient will demonstrate independence with HEP  3) Patient will demonstrate greater than Grade II on Hruska Adduction Lift Test   4) Patient will demonstrate ambulation with bilateral UE swing in AR shoes to allow for ambulation in home and community settings    Long Term Goals:  To be accomplished in 16 treatments:  1)Patient will demonstrate Grade IV or Grade V Hruska Adduction Lift Score to allow for functional mobility in home and community settings  2)Pt will demonstrate the ability to ambulate forward and backward achieving bilateral Acetabular Femoral Internal Rotation for pain free mobility  3)Pt will demonstrate the ability to walk and jog without subjective complaints or gait deviation  4)Pt will demonstrate independence with discharge HEP to allow for ambulation, sitting and standing without objective dysfunction   5)Pt will be able to return to work and to care for child without assistance needed    Frequency / Duration: Patient to be seen 1 times per week for up to 16 treatments. Patient/ Caregiver education and instruction: self care, activity modification, and exercises    [x]  Plan of care has been reviewed with PARAMJIT Ortiz, PT, DPT, [facility] 9/21/2022     ________________________________________________________________________    I certify that the above Therapy Services are being furnished while the patient is under my care. I agree with the treatment plan and certify that this therapy is necessary.     Physician's Signature:____________________  Date:____________Time: _________      Zakia Weber NP

## 2022-09-22 ENCOUNTER — HOSPITAL ENCOUNTER (OUTPATIENT)
Dept: PHYSICAL THERAPY | Age: 32
Discharge: HOME | End: 2022-09-22
Payer: COMMERCIAL

## 2022-09-22 PROCEDURE — 97112 NEUROMUSCULAR REEDUCATION: CPT | Performed by: PHYSICAL THERAPIST

## 2022-09-22 NOTE — PROGRESS NOTES
PT DAILY TREATMENT NOTE 2-15    Patient Name: Johann Spatz  Date:2022  : 1990  [x]  Patient  Verified  Payor: Ganga Dawkins / Plan: Michael Hart PPO / Product Type: PPO /    In time: 105 pm  Out time: 205 pm  Total Treatment Time (min): 60  Visit #:  2    Treatment Area: Other low back pain [M54.59]    SUBJECTIVE  Pain Level (0-10 scale): 3/10  Any medication changes, allergies to medications, adverse drug reactions, diagnosis change, or new procedure performed?: [x] No    [] Yes (see summary sheet for update)  Subjective functional status/changes:   [] No changes reported  Pt reports no increased pain following his first visit. He did get his new shoes at [facility]. OBJECTIVE     60 min Neuromuscular Re-education:  [x]  See flow sheet :   Rationale: increase ROM, improve coordination, improve balance, and increase proprioception  to improve the patients ability to return to work duty as tolerated            With   [] TE   [] TA   [x] Neuro   [] SC   [] other: Patient Education: [x] Review HEP    [x] Progressed/Changed HEP based on:   [x] positioning   [x] body mechanics   [x] transfers   [] heat/ice application    [] other:      Other Objective/Functional Measures:   + HGIR R  Supine mandibular protrusion with lateral trusion and conjugate gaze with mod dizziness present of intensity 4-5/10   Excellent noted effort with min to mod dizziness present upon rising to sitting from supine  Hidalgo Adrenaline 22 with good fit at time being    Pain Level (0-10 scale) post treatment: 'looser      ASSESSMENT/Changes in Function:   Excellent tolerance of all intervention today.   Patient will continue to benefit from skilled PT services to modify and progress therapeutic interventions, address functional mobility deficits, address ROM deficits, address strength deficits, analyze and address soft tissue restrictions, analyze and cue movement patterns, analyze and modify body mechanics/ergonomics, assess and modify postural abnormalities, address imbalance/dizziness, and instruct in home and community integration to attain remaining goals.      [x]  See Plan of Care  []  See progress note/recertification  []  See Discharge Summary         Progress towards goals / Updated goals:  Pt tolerated all treatment well with intensity of vestibular symptoms 5/10 at most.    PLAN  []  Upgrade activities as tolerated     [x]  Continue plan of care  [x]  Update interventions per flow sheet       [x]  Discharge due to:_  []  Other:_      Chante Mohr, PT, DPT, [facility]    9/22/2022

## 2022-09-28 ENCOUNTER — HOSPITAL ENCOUNTER (OUTPATIENT)
Dept: PHYSICAL THERAPY | Age: 32
Discharge: HOME | End: 2022-09-28
Payer: COMMERCIAL

## 2022-09-28 PROCEDURE — 97112 NEUROMUSCULAR REEDUCATION: CPT | Performed by: PHYSICAL THERAPIST

## 2022-09-28 NOTE — PROGRESS NOTES
PT DAILY TREATMENT NOTE 2-15    Patient Name: Kandice Asif  Date:2022  : 1990  [x]  Patient  Verified  Payor: Agapito Castillo / Plan: Zion Dixon PPO / Product Type: PPO /    In time: 1145 am  Out time: 1230 pm  Total Treatment Time (min): 45  Visit #:  3    Treatment Area: Other low back pain [M54.59]    SUBJECTIVE  Pain Level (0-10 scale): 3/10  Any medication changes, allergies to medications, adverse drug reactions, diagnosis change, or new procedure performed?: [x] No    [] Yes (see summary sheet for update)  Subjective functional status/changes:   [] No changes reported  Pt reports he is doing better. He is doing massage and has been doing his HEP 2x/day. He has had another EMDR visit and is tolerating this well. He hasn't felt any syncopal episodes since last visit. OBJECTIVE     45 min Neuromuscular Re-education:  [x]  See flow sheet : with ambulation with focus on bilateral great toe, R arch and bilateral UE swing   Rationale: increase ROM, improve coordination, improve balance, and increase proprioception  to improve the patients ability to return to work duty as tolerated            With   [] TE   [] TA   [x] Neuro   [] SC   [] other: Patient Education: [x] Review HEP    [x] Progressed/Changed HEP based on:   [x] positioning   [x] body mechanics   [x] transfers   [] heat/ice application    [] other:      Other Objective/Functional Measures:   Min + HGIR R  + PADT bilaterally  + bilateral horizontal abd  Apical expansion is improving  Supine mandibular protrusion with lateral trusion and conjugate gaze with mod dizziness present of intensity 4-5/10   Hidalgo Adrenaline 22 donned today    Pain Level (0-10 scale) post treatment: 'looser'      ASSESSMENT/Changes in Function:   Excellent tolerance of all intervention today. Excellent effort noted.    Patient will continue to benefit from skilled PT services to modify and progress therapeutic interventions, address functional mobility deficits, address ROM deficits, address strength deficits, analyze and address soft tissue restrictions, analyze and cue movement patterns, analyze and modify body mechanics/ergonomics, assess and modify postural abnormalities, address imbalance/dizziness, and instruct in home and community integration to attain remaining goals.      [x]  See Plan of Care  []  See progress note/recertification  []  See Discharge Summary         Progress towards goals / Updated goals:  1) Patient will demonstrate Negative Hruska Adduction Drop Test   2) Patient will demonstrate independence with HEP met  3) Patient will demonstrate greater than Grade II on Hruska Adduction Lift Test     PLAN  []  Upgrade activities as tolerated     [x]  Continue plan of care  [x]  Update interventions per flow sheet       [x]  Discharge due to:_  []  Other:_      Mansoor Foster, PT, DPT, [facility]    9/28/2022

## 2022-10-07 ENCOUNTER — APPOINTMENT (OUTPATIENT)
Dept: PHYSICAL THERAPY | Age: 32
End: 2022-10-07

## 2022-10-07 ENCOUNTER — HOSPITAL ENCOUNTER (OUTPATIENT)
Dept: PHYSICAL THERAPY | Age: 32
Discharge: HOME | End: 2022-10-07
Payer: COMMERCIAL

## 2022-10-07 PROCEDURE — 97112 NEUROMUSCULAR REEDUCATION: CPT | Performed by: PHYSICAL THERAPIST

## 2022-10-07 NOTE — PROGRESS NOTES
PT DAILY TREATMENT NOTE 2-15    Patient Name: Cari Ordoñez  Date:10/7/2022  : 1990  [x]  Patient  Verified  Payor: Андрей Samuels / Plan: Mary Lou Geiger PPO / Product Type: PPO /    In time: 1144 am  Out time: 1240 pm  Total Treatment Time (min): 50  Visit #:  4    Treatment Area: Other low back pain [M54.59]    SUBJECTIVE  Pain Level (0-10 scale): 3/10 at rest  Any medication changes, allergies to medications, adverse drug reactions, diagnosis change, or new procedure performed?: [x] No    [] Yes (see summary sheet for update)  Subjective functional status/changes:   [] No changes reported  Pt reports he is frustrated with his PCP as they aren't really seeing eye to eye. He has been fully released to return to work, but his [address] cheif will allow for light duty. OBJECTIVE     50 min Neuromuscular Re-education:  [x]  See flow sheet : with ambulation with focus on bilateral great toe, R arch and bilateral UE swing   Rationale: increase ROM, improve coordination, improve balance, and increase proprioception  to improve the patients ability to return to work duty as tolerated            With   [] TE   [] TA   [x] Neuro   [] SC   [] other: Patient Education: [x] Review HEP    [x] Progressed/Changed HEP based on:   [x] positioning   [x] body mechanics   [x] transfers   [] heat/ice application    [] other:      Other Objective/Functional Measures:   Min + HGIR R  + PADT bilaterally - NT  + bilateral horizontal abd - NT  Apical expansion is improving  Limited cervical rotation    Pain Level (0-10 scale) post treatment: feels better      ASSESSMENT/Changes in Function:   Excellent tolerance of all intervention today. Excellent effort noted.    Patient will continue to benefit from skilled PT services to modify and progress therapeutic interventions, address functional mobility deficits, address ROM deficits, address strength deficits, analyze and address soft tissue restrictions, analyze and cue movement patterns, analyze and modify body mechanics/ergonomics, assess and modify postural abnormalities, address imbalance/dizziness, and instruct in home and community integration to attain remaining goals.      [x]  See Plan of Care  []  See progress note/recertification  []  See Discharge Summary         Progress towards goals / Updated goals:  1) Patient will demonstrate Negative Hruska Adduction Drop Test met  2) Patient will demonstrate independence with HEP met  3) Patient will demonstrate greater than Grade II on Hruska Adduction Lift Test  NT    PLAN  []  Upgrade activities as tolerated     [x]  Continue plan of care  [x]  Update interventions per flow sheet       [x]  Discharge due to:_  []  Other:_      Dione Montague, PT, DPT, [facility]    10/7/2022

## 2022-10-11 ENCOUNTER — HOSPITAL ENCOUNTER (OUTPATIENT)
Dept: PHYSICAL THERAPY | Age: 32
Discharge: HOME | End: 2022-10-11
Payer: COMMERCIAL

## 2022-10-11 PROCEDURE — 97112 NEUROMUSCULAR REEDUCATION: CPT | Performed by: PHYSICAL THERAPIST

## 2022-10-11 NOTE — PROGRESS NOTES
PT DAILY TREATMENT NOTE 2-15    Patient Name: Germaine Garrison  Date:10/11/2022  : 1990  [x]  Patient  Verified  Payor: Veronica Asp / Plan: Bry Robert PPO / Product Type: PPO /    In time: 804 am  Out time: 851 am  Total Treatment Time (min): 46  Visit #:  5    Treatment Area: Other low back pain [M54.59]    SUBJECTIVE  Pain Level (0-10 scale): 6/10   Any medication changes, allergies to medications, adverse drug reactions, diagnosis change, or new procedure performed?: [x] No    [] Yes (see summary sheet for update)  Subjective functional status/changes:   [] No changes reported  Pt reports he has been feeling tightness in his upper back and is more uncomfortable than at the last visit. He has been trying to get some arm swing when walking but it feels awkward. OBJECTIVE     46 min Neuromuscular Re-education:  [x]  See flow sheet : with ambulation with focus on bilateral great toe, R arch and bilateral UE swing   Rationale: increase ROM, improve coordination, improve balance, and increase proprioception  to improve the patients ability to return to work duty as tolerated            With   [] TE   [] TA   [x] Neuro   [] SC   [] other: Patient Education: [x] Review HEP    [x] Progressed/Changed HEP based on:   [x] positioning   [x] body mechanics   [x] transfers   [] heat/ice application    [] other:      Other Objective/Functional Measures:   Min + HGIR R  + bilateral horizontal abd - NT  Apical expansion is improving  Limited cervical rotation    V.c. for ambulation with R UE backward swing and sensing bilateral great toes    D/c use of balloon as pt was using abs to blow it up. Replaced the balloon with kazoo.     Pain Level (0-10 scale) post treatment: 'a little looser'      ASSESSMENT/Changes in Function:     Patient will continue to benefit from skilled PT services to modify and progress therapeutic interventions, address functional mobility deficits, address ROM deficits, address strength deficits, analyze and address soft tissue restrictions, analyze and cue movement patterns, analyze and modify body mechanics/ergonomics, assess and modify postural abnormalities, address imbalance/dizziness, and instruct in home and community integration to attain remaining goals.      []  See Plan of Care  []  See progress note/recertification  []  See Discharge Summary         Progress towards goals / Updated goals:  1) Patient will demonstrate Negative Hruska Adduction Drop Test met  2) Patient will demonstrate independence with HEP met  3) Patient will demonstrate greater than Grade II on Hruska Adduction Lift Test  NT    PLAN  []  Upgrade activities as tolerated     [x]  Continue plan of care  [x]  Update interventions per flow sheet       [x]  Discharge due to:_  [x]  Other:_ cont 1x/wk     Abilio Awad PT, DPT, [facility]    10/11/2022

## 2022-10-19 ENCOUNTER — HOSPITAL ENCOUNTER (OUTPATIENT)
Dept: PHYSICAL THERAPY | Age: 32
Discharge: HOME | End: 2022-10-19
Payer: COMMERCIAL

## 2022-10-19 PROCEDURE — 97112 NEUROMUSCULAR REEDUCATION: CPT | Performed by: PHYSICAL THERAPIST

## 2022-10-19 NOTE — PROGRESS NOTES
PT DAILY TREATMENT NOTE 2-15    Patient Name: Doris Tian  Date:10/19/2022  : 1990  [x]  Patient  Verified  Payor: Clay Kauffman / Plan: Federico Jose PPO / Product Type: PPO /    In time: 1230 pm  Out time: 115 pm  Total Treatment Time (min): 45  Visit #:  6    Treatment Area: Other low back pain [M54.59]    SUBJECTIVE  Pain Level (0-10 scale): 0/10   Any medication changes, allergies to medications, adverse drug reactions, diagnosis change, or new procedure performed?: [x] No    [] Yes (see summary sheet for update)  Subjective functional status/changes:   [] No changes reported  Pt reports he has been doing pretty well overall. He isn't having pain today. He has not been able to return to work because his NP will not sign off on returning to work. He is waiting on MRI to be scheduled. He can stand on his L LE better and his walking is feeling a little bit better.      OBJECTIVE     45 min Neuromuscular Re-education:  [x]  See flow sheet : introduction of cranial torsion exercises    Rationale: increase ROM, improve coordination, improve balance, and increase proprioception  to improve the patients ability to return to work duty as tolerated            With   [] TE   [] TA   [x] Neuro   [] SC   [] other: Patient Education: [x] Review HEP    [x] Progressed/Changed HEP based on:   [x] positioning   [x] body mechanics   [x] transfers   [] heat/ice application    [] other:      Other Objective/Functional Measures:   + HGIR R  + bilateral horizontal abd on L   Apical expansion is improving  Limited cervical rotation to L     V.c. for ambulation with R UE backward swing and sensing bilateral great toes      Pain Level (0-10 scale) post treatment: 0/10      ASSESSMENT/Changes in Function:     Patient will continue to benefit from skilled PT services to modify and progress therapeutic interventions, address functional mobility deficits, address ROM deficits, address strength deficits, analyze and address soft tissue restrictions, analyze and cue movement patterns, analyze and modify body mechanics/ergonomics, assess and modify postural abnormalities, address imbalance/dizziness, and instruct in home and community integration to attain remaining goals.      []  See Plan of Care  []  See progress note/recertification  []  See Discharge Summary         Progress towards goals / Updated goals:  1) Patient will demonstrate Negative Hruska Adduction Drop Test met  2) Patient will demonstrate independence with HEP met  3) Patient will demonstrate greater than Grade II on Hruska Adduction Lift Test  NT    PLAN  []  Upgrade activities as tolerated     [x]  Continue plan of care  [x]  Update interventions per flow sheet       [x]  Discharge due to:_  [x]  Other:_ cont 1x/wk     Rosy Goodpasture, PT, DPT, [facility]    10/19/2022

## 2022-10-28 ENCOUNTER — HOSPITAL ENCOUNTER (OUTPATIENT)
Dept: PHYSICAL THERAPY | Age: 32
Discharge: HOME | End: 2022-10-28
Payer: COMMERCIAL

## 2022-10-28 PROCEDURE — 97112 NEUROMUSCULAR REEDUCATION: CPT | Performed by: PHYSICAL THERAPIST

## 2022-10-28 PROCEDURE — 97140 MANUAL THERAPY 1/> REGIONS: CPT | Performed by: PHYSICAL THERAPIST

## 2022-10-28 NOTE — PROGRESS NOTES
PT DAILY TREATMENT NOTE 2-15    Patient Name: Luis Shin  Date:10/28/2022  : 1990  [x]  Patient  Verified  Payor: Lucy Haider / Plan: Joey Caceres PPO / Product Type: PPO /    In time: 1100 am  Out time: 1200 pm  Total Treatment Time (min): 60  Visit #:  7    Treatment Area: Other low back pain [M54.59]    SUBJECTIVE  Pain Level (0-10 scale): 1/10   Any medication changes, allergies to medications, adverse drug reactions, diagnosis change, or new procedure performed?: [x] No    [] Yes (see summary sheet for update)  Subjective functional status/changes:   [] No changes reported  Pt reports he did have the MRI of his thoracic spine and he did have an anxiety reaction during this time. (In how pt describes symptoms, he had a panic attack potentially). His pn level got back to 8/10 and he feels like he went back to square one. He was really tight feeling and had a significant headache. He does not know if he will be returning to light duty until the MRI results are presented. Dec. 19 his child is due. His therapist thought that may he should wait to start the SSRI/SNRI since the PT was working, however this was before he had the panic reaction when in the MRI testing. He is going to discuss this next visit with his therapist.      OBJECTIVE     45 min Neuromuscular Re-education:  [x]  See flow sheet : introduction of cranial torsion exercises    Rationale: increase ROM, improve coordination, improve balance, and increase proprioception  to improve the patients ability to return to work duty as tolerated    15 min Manual Therapy:   Ice tong technique, L AIC and rib pumping   Rationale: increase ROM, improve coordination, improve balance, and increase proprioception  to improve the patients ability to return to work duty as tolerated            With   [] TE   [] TA   [x] Neuro   [] SC   [] other: Patient Education: [x] Review HEP    [x] Progressed/Changed HEP based on:   [x] positioning   [x] body mechanics [x] transfers   [] heat/ice application    [] other:      Other Objective/Functional Measures:   + HGIR R  + bilateral horizontal abd   Apical expansion is improving  Limited cervical rotation to L     V.c. for ambulation with R UE backward swing and sensing R great toe to reduce R lateral foot whip      Pain Level (0-10 scale) post treatment: 0/10      ASSESSMENT/Changes in Function:     Patient will continue to benefit from skilled PT services to modify and progress therapeutic interventions, address functional mobility deficits, address ROM deficits, address strength deficits, analyze and address soft tissue restrictions, analyze and cue movement patterns, analyze and modify body mechanics/ergonomics, assess and modify postural abnormalities, address imbalance/dizziness, and instruct in home and community integration to attain remaining goals.      []  See Plan of Care  []  See progress note/recertification  []  See Discharge Summary         Progress towards goals / Updated goals:  1) Patient will demonstrate Negative Hruska Adduction Drop Test met  2) Patient will demonstrate independence with HEP met  3) Patient will demonstrate greater than Grade II on Hruska Adduction Lift Test  NT    PLAN  []  Upgrade activities as tolerated     [x]  Continue plan of care  [x]  Update interventions per flow sheet       [x]  Discharge due to:_  [x]  Other:_ cont 1x/wk - will f/u with therapist regarding initiating pharmacology      Leonel Hutchinson, PT, DPT, [facility]    10/28/2022

## 2022-11-01 ENCOUNTER — HOSPITAL ENCOUNTER (OUTPATIENT)
Dept: PHYSICAL THERAPY | Age: 32
Discharge: HOME | End: 2022-11-01
Payer: COMMERCIAL

## 2022-11-01 PROCEDURE — 97112 NEUROMUSCULAR REEDUCATION: CPT | Performed by: PHYSICAL THERAPIST

## 2022-11-01 PROCEDURE — 97140 MANUAL THERAPY 1/> REGIONS: CPT | Performed by: PHYSICAL THERAPIST

## 2022-11-01 NOTE — PROGRESS NOTES
PT DAILY TREATMENT NOTE 2-15    Patient Name: Ashleigh Sargent  Date:2022  : 1990  [x]  Patient  Verified  Payor: Selinemelyn Power / Plan: Phylliss Dancer PPO / Product Type: PPO /    In time: 716 am  Out time: 800 am  Total Treatment Time (min): 44  Visit #:  8    Treatment Area: Other low back pain [M54.59]    SUBJECTIVE  Pain Level (0-10 scale): 1-3/10   Any medication changes, allergies to medications, adverse drug reactions, diagnosis change, or new procedure performed?: [x] No    [] Yes (see summary sheet for update)  Subjective functional status/changes:   [] No changes reported  Pt reports he got the MRI results from his MD and there is C5-6 and C6-7 DDD and lateral foraminal impingement. He had trouble sleeping last night due to discomfort. His MD was willing to do an epidural injection if he would like. He now has a letter to be able to do light duty. He has turned this in and is waiting to see what he is able to do. He sees his psychologist around 11 am today.        OBJECTIVE     34 min Neuromuscular Re-education:  [x]  See flow sheet : introduction of cranial torsion exercises    Rationale: increase ROM, improve coordination, improve balance, and increase proprioception  to improve the patients ability to return to work duty as tolerated    10 min Manual Therapy: L AIC and rib pumping; suboccipital release   Rationale: increase ROM, improve coordination, improve balance, and increase proprioception  to improve the patients ability to return to work duty as tolerated            With   [] TE   [] TA   [x] Neuro   [] SC   [] other: Patient Education: [x] Review HEP    [x] Progressed/Changed HEP based on:   [x] positioning   [x] body mechanics   [x] transfers   [] heat/ice application    [] other:      Other Objective/Functional Measures:   + HGIR R  + bilateral horizontal abd   Apical expansion is improving  Limited cervical rotation to L - improved as compared to last visit    Seated posture with forward bending/flex at hips present - v.c. corrects      Pain Level (0-10 scale) post treatment: 0/10      ASSESSMENT/Changes in Function:     Patient will continue to benefit from skilled PT services to modify and progress therapeutic interventions, address functional mobility deficits, address ROM deficits, address strength deficits, analyze and address soft tissue restrictions, analyze and cue movement patterns, analyze and modify body mechanics/ergonomics, assess and modify postural abnormalities, address imbalance/dizziness, and instruct in home and community integration to attain remaining goals.      []  See Plan of Care  []  See progress note/recertification  []  See Discharge Summary         Progress towards goals / Updated goals:  1) Patient will demonstrate Negative Hruska Adduction Drop Test met  2) Patient will demonstrate independence with HEP met  3) Patient will demonstrate greater than Grade II on Hruska Adduction Lift Test  NT    PLAN  []  Upgrade activities as tolerated     [x]  Continue plan of care  [x]  Update interventions per flow sheet       [x]  Discharge due to:_  [x]  Other:_ cont 1x/wk - will f/u with therapist regarding initiating pharmacology at visit today      Darrian Noel, PT, DPT, [facility]    11/1/2022

## 2022-11-08 ENCOUNTER — HOSPITAL ENCOUNTER (OUTPATIENT)
Dept: PHYSICAL THERAPY | Age: 32
Discharge: HOME | End: 2022-11-08
Payer: COMMERCIAL

## 2022-11-08 PROCEDURE — 97112 NEUROMUSCULAR REEDUCATION: CPT | Performed by: PHYSICAL THERAPIST

## 2022-11-08 NOTE — PROGRESS NOTES
PT DAILY TREATMENT NOTE 2-15    Patient Name: Saskia Brice  Date:2022  : 1990  [x]  Patient  Verified  Payor: Kylee Oliveira / Plan: Ariadne Frazier PPO / Product Type: PPO /    In time: 1100 am  Out time: 1200 pm  Total Treatment Time (min): 60  Visit #:  9    Treatment Area: Other low back pain [M54.59]    SUBJECTIVE  Pain Level (0-10 scale): 1/10   Any medication changes, allergies to medications, adverse drug reactions, diagnosis change, or new procedure performed?: [x] No    [] Yes (see summary sheet for update)  Subjective functional status/changes:   [] No changes reported  Pt reports he began taking the Paxil 2022. He feels like this is helping to tone things down a little. He started light duty yesterday. He is working 5 days per week, 8 hours each day. He is working on his exercises while at work. He is working on getting a chair that fits well. He might try a standing desk that they have. Is taking a super greens in the morning.      OBJECTIVE     34 min Neuromuscular Re-education:  [x]  See flow sheet : introduction of cranial torsion exercises    Rationale: increase ROM, improve coordination, improve balance, and increase proprioception  to improve the patients ability to return to work duty as tolerated    Modality rationale: decrease pain and increase tissue extensibility to improve the patients ability to sit at work for light duty   Min Type Additional Details    [] Estim: []Att   []Unatt        []TENS instruct                  []IFC  []Premod   []NMES                     []Other:  []w/US   []w/ice   []w/heat  Position:  Location:    []  Traction: [] Cervical       []Lumbar                       [] Prone          []Supine                       []Intermittent   []Continuous Lbs:  [] before manual  [] after manual  []w/heat    []  Ultrasound: []Continuous   [] Pulsed at:                            []1MHz   []3MHz Location:  W/cm2:    []  Paraffin         Location:  []w/heat   15 []  Ice [x]  Heat  []  Ice massage Position: supine 90/90  Location: entire back, ant chest    []  Laser  []  Other: Position:  Location:    []  Vasopneumatic Device Pressure:       [] lo [] med [] hi   Temperature:    [x] Skin assessment post-treatment:  [x]intact [x]redness- no adverse reaction    []redness - adverse reaction:              With   [] TE   [] TA   [x] Neuro   [] SC   [] other: Patient Education: [x] Review HEP    [x] Progressed/Changed HEP based on:   [x] positioning   [x] body mechanics   [x] transfers   [] heat/ice application    [] other:      Other Objective/Functional Measures:   + HGIR R  + PADT bilaterally  + L HADDT   Apical expansion is fair with v.c. for reduction of accessory mm activation   Limited cervical rotation to L - improved as compared to last visit    Seated bilateral hip abducted position on treatment table  Forward bend test 2 inches  Squat Level Level 3 to 3+ with sig v.c.      Pain Level (0-10 scale) post treatment: 0/10      ASSESSMENT/Changes in Function:     Patient will continue to benefit from skilled PT services to modify and progress therapeutic interventions, address functional mobility deficits, address ROM deficits, address strength deficits, analyze and address soft tissue restrictions, analyze and cue movement patterns, analyze and modify body mechanics/ergonomics, assess and modify postural abnormalities, address imbalance/dizziness, and instruct in home and community integration to attain remaining goals.      []  See Plan of Care  []  See progress note/recertification  []  See Discharge Summary         Progress towards goals / Updated goals:  1) Patient will demonstrate Negative Hruska Adduction Drop Test met  2) Patient will demonstrate independence with HEP met  3) Patient will demonstrate greater than Grade II on Hruska Adduction Lift Test  met    PLAN  []  Upgrade activities as tolerated     [x]  Continue plan of care  [x]  Update interventions per flow sheet [x]  Discharge due to:_  []  Other:_       Samuel Arevalo, PT, DPT, [facility]    11/8/2022

## 2022-11-15 ENCOUNTER — HOSPITAL ENCOUNTER (OUTPATIENT)
Dept: PHYSICAL THERAPY | Age: 32
Discharge: HOME | End: 2022-11-15
Payer: COMMERCIAL

## 2022-11-15 PROCEDURE — 97112 NEUROMUSCULAR REEDUCATION: CPT | Performed by: PHYSICAL THERAPIST

## 2022-11-22 ENCOUNTER — HOSPITAL ENCOUNTER (OUTPATIENT)
Dept: PHYSICAL THERAPY | Age: 32
Discharge: HOME | End: 2022-11-22
Payer: COMMERCIAL

## 2022-11-22 PROCEDURE — 97140 MANUAL THERAPY 1/> REGIONS: CPT | Performed by: PHYSICAL THERAPIST

## 2022-11-22 PROCEDURE — 97112 NEUROMUSCULAR REEDUCATION: CPT | Performed by: PHYSICAL THERAPIST

## 2022-11-22 NOTE — PROGRESS NOTES
PT DAILY TREATMENT NOTE 2-15    Patient Name: Joaquin Del Rio  Date:2022  : 1990  [x]  Patient  Verified  Payor: Michelle Scott / Plan: Eulalio Mealing PPO / Product Type: PPO /    In time: 805 am  Out time: 900 am  Total Treatment Time (min): 45 (pt recovered on plinth following intervention)  Visit #:  11    Treatment Area: Other low back pain [M54.59]    SUBJECTIVE  Pain Level (0-10 scale): 10   Any medication changes, allergies to medications, adverse drug reactions, diagnosis change, or new procedure performed?: [x] No    [] Yes (see summary sheet for update)  Subjective functional status/changes:   [] No changes reported  Pt reports that he isn't doing very well this morning. Pt reports he has been up most of the night with a 'panic headache'. He reports that he is having tightness in his L shoulder and is very uncomfortable. He did work yesterday at his job in [employer] duty and was in sitting and has found a better chair for work. He isn't working today. He does have a OB appt with his wife today. He also has to see the physician that ordered the Paxil as he only wrote 30 days of this.      OBJECTIVE     30 min Neuromuscular Re-education:  [x]  See flow sheet : introduction of cranial torsion exercises    Rationale: increase ROM, improve coordination, improve balance, and increase proprioception  to improve the patients ability to return to work duty as tolerated    10 min Manual Therapy:  L AIC, rib pumping, R subclavius in supine while on heat   Rationale: increase ROM, improve coordination, improve balance, and increase proprioception  to improve the patients ability to return to work duty as tolerated     Modality rationale: decrease pain, increase tissue extensibility, and reduce ANS signs of elevated HR and BP  to improve the patients ability to perform self care activities   Min Type Additional Details    [] Estim: []Att   []Unatt        []TENS instruct                  []IFC  []Premod   []NMES []Other:  []w/US   []w/ice   []w/heat  Position:  Location:    []  Traction: [] Cervical       []Lumbar                       [] Prone          []Supine                       []Intermittent   []Continuous Lbs:  [] before manual  [] after manual  []w/heat    []  Ultrasound: []Continuous   [] Pulsed at:                            []1MHz   []3MHz Location:  W/cm2:    []  Paraffin         Location:  []w/heat   15 [x]  Ice     [x]  Heat  []  Ice massage Position: heat on back, ice on forehead in supine 90/90  Location: with guided respiration and positioning to reduce bilateral pec tone and increase exhalation capability    []  Laser  []  Other: Position:  Location:    []  Vasopneumatic Device Pressure:       [] lo [] med [] hi   Temperature:    [x] Skin assessment post-treatment:  [x]intact [x]redness- no adverse reaction    []redness - adverse reaction:            With   [] TE   [] TA   [x] Neuro   [] SC   [] other: Patient Education: [x] Review HEP    [x] Progressed/Changed HEP based on:   [x] positioning   [x] body mechanics   [x] transfers   [] heat/ice application    [] other:      Other Objective/Functional Measures:     BP at end of session 144/98 - following measure pt became presyncopal; PTA sat and spoke with pt to ensure that he was ok to leave session     Below not taken today:  + HGIR R  + PADT bilaterally - NT  + L HADDT - NT  Apical expansion is fair with v.c. for reduction of accessory mm activation   Seated bilateral hip abducted position on treatment table  Forward bend test 0 inches from tops of shoes  Squat Level Level 3+   HADLT R 1, L 2      Pain Level (0-10 scale) post treatment: 0/10      ASSESSMENT/Changes in Function:     Patient will continue to benefit from skilled PT services to modify and progress therapeutic interventions, address functional mobility deficits, address ROM deficits, address strength deficits, analyze and address soft tissue restrictions, analyze and cue movement patterns, analyze and modify body mechanics/ergonomics, assess and modify postural abnormalities, address imbalance/dizziness, and instruct in home and community integration to attain remaining goals. []  See Plan of Care  []  See progress note/recertification  []  See Discharge Summary         Progress towards goals / Updated goals:  Pt is demonstrating s/s of ANS overdrive with physiological and psychological reactions present.      PLAN  []  Upgrade activities as tolerated     [x]  Continue plan of care  [x]  Update interventions per flow sheet       []  Discharge due to:_  [x]  Other:_ pt was encouraged to speak with his PCP regarding the need for a more immediate acting medication for anxiety that is raising BP      Huber Washington, PT, DPT, [facility]    11/22/2022

## 2022-11-29 ENCOUNTER — HOSPITAL ENCOUNTER (OUTPATIENT)
Dept: PHYSICAL THERAPY | Age: 32
Discharge: HOME | End: 2022-11-29
Payer: COMMERCIAL

## 2022-11-29 PROCEDURE — 97112 NEUROMUSCULAR REEDUCATION: CPT | Performed by: PHYSICAL THERAPIST

## 2022-11-29 PROCEDURE — 97140 MANUAL THERAPY 1/> REGIONS: CPT | Performed by: PHYSICAL THERAPIST

## 2022-11-29 NOTE — PROGRESS NOTES
PT DAILY TREATMENT NOTE 2-15    Patient Name: Ivone Murphy  Date:2022  : 1990  [x]  Patient  Verified  Payor: Mary Ellen Solitario / Plan: Peter Gavin PPO / Product Type: PPO /    In time: 800 am  Out time: 855 am  Total Treatment Time (min): 55   Visit #:  12    Treatment Area: Other low back pain [M54.59]    SUBJECTIVE  Pain Level (0-10 scale): 6/10   Any medication changes, allergies to medications, adverse drug reactions, diagnosis change, or new procedure performed?: [x] No    [] Yes (see summary sheet for update)  Subjective functional status/changes:   [] No changes reported  Pt reports that he isn't doing very well this morning. Pt reports he has been up most of the night with a 'panic headache'. He reports that he is having tightness in his L shoulder and is very uncomfortable. He did work yesterday at his job in [employer] duty and was in sitting and has found a better chair for work. He isn't working today. He does have a OB appt with his wife today. He also has to see the physician that ordered the Paxil as he only wrote 30 days of this.      OBJECTIVE     35 min Neuromuscular Re-education:  [x]  See flow sheet : introduction of cranial torsion exercises    Rationale: increase ROM, improve coordination, improve balance, and increase proprioception  to improve the patients ability to return to work duty as tolerated    10 min Manual Therapy:  L AIC, rib pumping, R subclavius   Rationale: increase ROM, improve coordination, improve balance, and increase proprioception  to improve the patients ability to return to work duty as tolerated      Modality rationale: decrease pain and increase tissue extensibility to improve the patients ability to sit without back pain   Min Type Additional Details    [] Estim: []Att   []Unatt        []TENS instruct                  []IFC  []Premod   []NMES                     []Other:  []w/US   []w/ice   []w/heat  Position:  Location:    []  Traction: [] Cervical []Lumbar                       [] Prone          []Supine                       []Intermittent   []Continuous Lbs:  [] before manual  [] after manual  []w/heat    []  Ultrasound: []Continuous   [] Pulsed at:                            []1MHz   []3MHz Location:  W/cm2:    []  Paraffin         Location:  []w/heat   10 []  Ice     [x]  Heat  []  Ice massage Position: supine 90/90  Location: ant chest, entire back    []  Laser  []  Other: Position:  Location:    []  Vasopneumatic Device Pressure:       [] lo [] med [] hi   Temperature:    [x] Skin assessment post-treatment:  [x]intact [x]redness- no adverse reaction    []redness - adverse reaction:            With   [] TE   [] TA   [x] Neuro   [] SC   [] other: Patient Education: [x] Review HEP    [x] Progressed/Changed HEP based on:   [x] positioning   [x] body mechanics   [x] transfers   [] heat/ice application    [] other:      Other Objective/Functional Measures:     + HGIR R  + PADT bilaterally   + L HADDT - NT  Apical expansion is fair to good with v.c. for reduction of accessory mm activation   Squat Level Level 3+ - NT   HADLT R 1, L 2 - NT      Pain Level (0-10 scale) post treatment: 0/10      ASSESSMENT/Changes in Function:     Patient will continue to benefit from skilled PT services to modify and progress therapeutic interventions, address functional mobility deficits, address ROM deficits, address strength deficits, analyze and address soft tissue restrictions, analyze and cue movement patterns, analyze and modify body mechanics/ergonomics, assess and modify postural abnormalities, address imbalance/dizziness, and instruct in home and community integration to attain remaining goals.      []  See Plan of Care  []  See progress note/recertification  []  See Discharge Summary         Progress towards goals / Updated goals:  Pt was positioned well today and was able to inhale and exhale without increased pec or ant neck mm activation    PLAN  []  Upgrade activities as tolerated     [x]  Continue plan of care  [x]  Update interventions per flow sheet       []  Discharge due to:_  []  Other:_       Barbara Montes, PT, DPT, [facility]    11/29/2022

## 2022-12-06 ENCOUNTER — APPOINTMENT (OUTPATIENT)
Dept: PHYSICAL THERAPY | Age: 32
End: 2022-12-06
Payer: COMMERCIAL

## 2022-12-09 ENCOUNTER — HOSPITAL ENCOUNTER (OUTPATIENT)
Dept: PHYSICAL THERAPY | Age: 32
Discharge: HOME | End: 2022-12-09
Payer: COMMERCIAL

## 2022-12-09 PROCEDURE — 97112 NEUROMUSCULAR REEDUCATION: CPT | Performed by: PHYSICAL THERAPIST

## 2022-12-09 NOTE — PROGRESS NOTES
PT DAILY TREATMENT NOTE 2-15    Patient Name: Blaine Verma  Date:2022  : 1990  [x]  Patient  Verified  Payor: Opla Chawla / Plan: James Samson PPO / Product Type: PPO /    In time: 1130 am  Out time: 1230 am  Total Treatment Time (min): 60   Visit #:  13    Treatment Area: Other low back pain [M54.59]    SUBJECTIVE  Pain Level (0-10 scale): 3/10   Any medication changes, allergies to medications, adverse drug reactions, diagnosis change, or new procedure performed?: [x] No    [] Yes (see summary sheet for update)  Subjective functional status/changes:   [] No changes reported  Pt reports that he is having L sided neck tightness and soreness in his low back. OBJECTIVE     55 min Neuromuscular Re-education:  [x]  See flow sheet : introduction of cranial torsion exercises    Rationale: increase ROM, improve coordination, improve balance, and increase proprioception  to improve the patients ability to return to work duty as tolerated                  With   [] TE   [] TA   [x] Neuro   [] SC   [] other: Patient Education: [x] Review HEP    [x] Progressed/Changed HEP based on:   [x] positioning   [x] body mechanics   [x] transfers   [] heat/ice application    [] other:      Other Objective/Functional Measures:     + HGIR bilaterally  + PADT bilaterally   + L HADDT   Apical expansion is fair to good  Squat Level Level 3    HADLT R 1, L 2 - NT      Pain Level (0-10 scale) post treatment: 0/10      ASSESSMENT/Changes in Function:     Patient will continue to benefit from skilled PT services to modify and progress therapeutic interventions, address functional mobility deficits, address ROM deficits, address strength deficits, analyze and address soft tissue restrictions, analyze and cue movement patterns, analyze and modify body mechanics/ergonomics, assess and modify postural abnormalities, address imbalance/dizziness, and instruct in home and community integration to attain remaining goals.      []  See Plan of Care  []  See progress note/recertification  []  See Discharge Summary         Progress towards goals / Updated goals:  Pt tolerated intervention well and current HEP will keep him in neutral.    PLAN  []  Upgrade activities as tolerated     [x]  Continue plan of care  [x]  Update interventions per flow sheet       []  Discharge due to:_  []  Other:_       Augie Myles, PT, DPT, [facility]    12/9/2022

## 2022-12-13 ENCOUNTER — HOSPITAL ENCOUNTER (OUTPATIENT)
Dept: PHYSICAL THERAPY | Age: 32
Discharge: HOME | End: 2022-12-13
Payer: COMMERCIAL

## 2022-12-13 PROCEDURE — 97112 NEUROMUSCULAR REEDUCATION: CPT | Performed by: PHYSICAL THERAPIST

## 2022-12-13 NOTE — PROGRESS NOTES
PT DAILY TREATMENT NOTE 2-15    Patient Name: [place] Simone  Date:2022  : 1990  [x]  Patient  Verified  Payor: Rajni Frank / Plan: Edis Motneiro PPO / Product Type: PPO /    In time: 804 am  Out time: 848 am  Total Treatment Time (min): 44   Visit #:  14    Treatment Area: Other low back pain [M54.59]    SUBJECTIVE  Pain Level (0-10 scale): 2-3/10   Any medication changes, allergies to medications, adverse drug reactions, diagnosis change, or new procedure performed?: [x] No    [] Yes (see summary sheet for update)  Subjective functional status/changes:   [] No changes reported  Pt reports that he has done his exercises twice since his last visit because they had company in town and also his  is not sleeping consistently.      OBJECTIVE     44 min Neuromuscular Re-education:  [x]  See flow sheet : introduction of cranial torsion exercises    Rationale: increase ROM, improve coordination, improve balance, and increase proprioception  to improve the patients ability to return to work duty as tolerated                  With   [] TE   [] TA   [x] Neuro   [] SC   [] other: Patient Education: [x] Review HEP    [x] Progressed/Changed HEP based on:   [x] positioning   [x] body mechanics   [x] transfers   [] heat/ice application    [] other:      Other Objective/Functional Measures:     *AR objective measures not taken at beginning of session today secondary to poor sleep and limited HEP compliance since last visit.  + HGIR bilaterally  + PADT bilaterally   + L HADDT   Apical expansion is fair to good  Squat Level Level 3    HADLT R 1, L 2 - NT      Pain Level (0-10 scale) post treatment: 0/10      ASSESSMENT/Changes in Function:     Patient will continue to benefit from skilled PT services to modify and progress therapeutic interventions, address functional mobility deficits, address ROM deficits, address strength deficits, analyze and address soft tissue restrictions, analyze and cue movement patterns, analyze and modify body mechanics/ergonomics, assess and modify postural abnormalities, address imbalance/dizziness, and instruct in home and community integration to attain remaining goals. []  See Plan of Care  []  See progress note/recertification  []  See Discharge Summary         Progress towards goals / Updated goals:  Pt would benefit from increased completion of HEP.        PLAN  []  Upgrade activities as tolerated     [x]  Continue plan of care  [x]  Update interventions per flow sheet       []  Discharge due to:_  []  Other:_       Chato Granda, PT, DPT, [facility]    12/13/2022

## 2022-12-20 ENCOUNTER — HOSPITAL ENCOUNTER (OUTPATIENT)
Dept: PHYSICAL THERAPY | Age: 32
Discharge: HOME | End: 2022-12-20
Payer: COMMERCIAL

## 2022-12-20 PROCEDURE — 97112 NEUROMUSCULAR REEDUCATION: CPT | Performed by: PHYSICAL THERAPIST

## 2022-12-20 NOTE — PROGRESS NOTES
PT DAILY TREATMENT NOTE 2-15    Patient Name: Contreras William  Date:2022  : 1990  [x]  Patient  Verified  Payor: Gabrielle Vela / Plan: Tim Álvaerz PPO / Product Type: PPO /    In time: 800 am  Out time: 845 am  Total Treatment Time (min): 45   Visit #:  15    Treatment Area: Other low back pain [M54.59]    SUBJECTIVE  Pain Level (0-10 scale): 1/10   Any medication changes, allergies to medications, adverse drug reactions, diagnosis change, or new procedure performed?: [x] No    [] Yes (see summary sheet for update)  Subjective functional status/changes:     Pt reports that he got his new bed and it is going well. He is trying to get the firmness corrected so that he can best. He did have a HA after his last visit. He is liking the reciprocating movements with his exercises. He reports that his symptoms come and go. He did have a massage that lasted about 2 days of relaxation and feeling looser.      OBJECTIVE     45 min Neuromuscular Re-education:  [x]  See flow sheet : introduction of cranial torsion exercises    Rationale: increase ROM, improve coordination, improve balance, and increase proprioception  to improve the patients ability to return to work duty as tolerated                  With   [] TE   [] TA   [x] Neuro   [] SC   [] other: Patient Education: [x] Review HEP    [x] Progressed/Changed HEP based on:   [x] positioning   [x] body mechanics   [x] transfers   [] heat/ice application    [] other:      Other Objective/Functional Measures:     + HGIR R > L but bilaterally  - PADT bilaterally   - bilateral HADDT   Apical expansion is good  Squat Level Level 3 with less v.c. needed   HADLT R 2, L 3 -      Pain Level (0-10 scale) post treatment: 0/10      ASSESSMENT/Changes in Function:     Patient will continue to benefit from skilled PT services to modify and progress therapeutic interventions, address functional mobility deficits, address ROM deficits, address strength deficits, analyze and address soft tissue restrictions, analyze and cue movement patterns, analyze and modify body mechanics/ergonomics, assess and modify postural abnormalities, address imbalance/dizziness, and instruct in home and community integration to attain remaining goals. []  See Plan of Care  []  See progress note/recertification  []  See Discharge Summary         Progress towards goals / Updated goals:  Pt would benefit from increased completion of HEP.        PLAN  []  Upgrade activities as tolerated     [x]  Continue plan of care  [x]  Update interventions per flow sheet       []  Discharge due to:_  []  Other:_       Patt Latham, PT, DPT, [facility]    12/20/2022

## 2022-12-27 ENCOUNTER — APPOINTMENT (OUTPATIENT)
Dept: PHYSICAL THERAPY | Age: 32
End: 2022-12-27
Payer: COMMERCIAL

## 2023-01-03 ENCOUNTER — HOSPITAL ENCOUNTER (OUTPATIENT)
Dept: PHYSICAL THERAPY | Age: 33
Discharge: HOME | End: 2023-01-03
Payer: COMMERCIAL

## 2023-01-03 PROCEDURE — 97112 NEUROMUSCULAR REEDUCATION: CPT | Performed by: PHYSICAL THERAPIST

## 2023-01-03 NOTE — PROGRESS NOTES
PT DAILY TREATMENT NOTE 2-15    Patient Name: Isaac Moreno  Date:1/3/2023  : 1990  [x]  Patient  Verified  Payor: Chato Randle / Plan: Nitza Crane PPO / Product Type: PPO /    In time: 803 am  Out time: 848 am  Total Treatment Time (min): 45   Visit #:  16    Treatment Area: Other low back pain [M54.59]    SUBJECTIVE  Pain Level (0-10 scale): 0/10   Any medication changes, allergies to medications, adverse drug reactions, diagnosis change, or new procedure performed?: [x] No    [] Yes (see summary sheet for update)  Subjective functional status/changes:     Pt reports that he has been having some L rib tightness in his mid-back area. He has been having neck tightness. He isn't sleeping super well due to his .     OBJECTIVE     45 min Neuromuscular Re-education:  [x]  See flow sheet : introduction of cranial torsion exercises    Rationale: increase ROM, improve coordination, improve balance, and increase proprioception  to improve the patients ability to return to work duty as tolerated               With   [] TE   [] TA   [x] Neuro   [] SC   [] other: Patient Education: [x] Review HEP    [x] Progressed/Changed HEP based on:   [x] positioning   [x] body mechanics   [x] transfers   [] heat/ice application    [] other:      Other Objective/Functional Measures:     + HGIR bilaterally (min + cont after repositioning  + PADT bilaterally (- following intervention)  + L HADDT (- following intervention)  Apical expansion is good  Squat Level Level 3+ with v.c. minimally  HADLT R 2, L 3 -  NT  Forward bend to toes with tightness in L side of mid/low back      Pain Level (0-10 scale) post treatment: 0/10      ASSESSMENT/Changes in Function:     Patient will continue to benefit from skilled PT services to modify and progress therapeutic interventions, address functional mobility deficits, address ROM deficits, address strength deficits, analyze and address soft tissue restrictions, analyze and cue movement patterns, analyze and modify body mechanics/ergonomics, assess and modify postural abnormalities, address imbalance/dizziness, and instruct in home and community integration to attain remaining goals. []  See Plan of Care  []  See progress note/recertification  []  See Discharge Summary         Progress towards goals / Updated goals:  Pt repositioned well with 2 exercises initially given. Pt is improving in body awareness to position.       PLAN  []  Upgrade activities as tolerated     [x]  Continue plan of care  [x]  Update interventions per flow sheet       []  Discharge due to:_  []  Other:_       Harshad Friday, PT, DPT, [facility]    1/3/2023

## 2023-01-10 ENCOUNTER — HOSPITAL ENCOUNTER (OUTPATIENT)
Dept: PHYSICAL THERAPY | Age: 33
Discharge: HOME | End: 2023-01-10
Payer: COMMERCIAL

## 2023-01-10 PROCEDURE — 97112 NEUROMUSCULAR REEDUCATION: CPT | Performed by: PHYSICAL THERAPIST

## 2023-01-10 NOTE — PROGRESS NOTES
PT DAILY TREATMENT NOTE 2-15    Patient Name: Shikha Turpin  Date:1/10/2023  : 1990  [x]  Patient  Verified  Payor: García Mckeon / Plan: iVki Nugent PPO / Product Type: PPO /    In time: 802 am  Out time: 847 am  Total Treatment Time (min): 45   Visit #:  17    Treatment Area: Other low back pain [M54.59]    SUBJECTIVE  Pain Level (0-10 scale): 0/10   Any medication changes, allergies to medications, adverse drug reactions, diagnosis change, or new procedure performed?: [x] No    [] Yes (see summary sheet for update)  Subjective functional status/changes:     Pt reports that he has felt good lately. He has not really been especially symptomatic in the last few days.      OBJECTIVE     45 min Neuromuscular Re-education:  [x]  See flow sheet : introduction of cranial torsion exercises    Rationale: increase ROM, improve coordination, improve balance, and increase proprioception  to improve the patients ability to return to work duty as tolerated               With   [] TE   [] TA   [x] Neuro   [] SC   [] other: Patient Education: [x] Review HEP    [x] Progressed/Changed HEP based on:   [x] positioning   [x] body mechanics   [x] transfers   [] heat/ice application    [] other:      Other Objective/Functional Measures:     + HGIR R  + PADT bilaterally (- following intervention)  + L HADDT (- following intervention)  Apical expansion is good  Squat Level Level 3+ with v.c. minimally  HADLT R 2, L 3 -  NT  V.c. for seated position       Pain Level (0-10 scale) post treatment: 0/10      ASSESSMENT/Changes in Function:     Patient will continue to benefit from skilled PT services to modify and progress therapeutic interventions, address functional mobility deficits, address ROM deficits, address strength deficits, analyze and address soft tissue restrictions, analyze and cue movement patterns, analyze and modify body mechanics/ergonomics, assess and modify postural abnormalities, address imbalance/dizziness, and instruct in home and community integration to attain remaining goals. []  See Plan of Care  []  See progress note/recertification  []  See Discharge Summary         Progress towards goals / Updated goals:  Pt repositioned well with 2 exercises initially given. Pt is improving in body awareness to position.       PLAN  []  Upgrade activities as tolerated     [x]  Continue plan of care  [x]  Update interventions per flow sheet       []  Discharge due to:_  []  Other:_       Grace Mcfadden, PT, DPT, [facility]    1/10/2023

## 2023-01-24 ENCOUNTER — HOSPITAL ENCOUNTER (OUTPATIENT)
Dept: PHYSICAL THERAPY | Age: 33
Discharge: HOME | End: 2023-01-24
Payer: COMMERCIAL

## 2023-01-24 PROCEDURE — 97016 VASOPNEUMATIC DEVICE THERAPY: CPT | Performed by: PHYSICAL THERAPIST

## 2023-01-24 PROCEDURE — 97112 NEUROMUSCULAR REEDUCATION: CPT | Performed by: PHYSICAL THERAPIST

## 2023-01-24 NOTE — PROGRESS NOTES
PT DAILY TREATMENT NOTE 2-15    Patient Name: Zenaida Stewart  Date:2023  : 1990  [x]  Patient  Verified  Payor: Martín Briones / Plan: Miguelangel Acevedo PPO / Product Type: PPO /    In time: 803 am  Out time: 905 am  Total Treatment Time (min): 58  Visit #:  18    Treatment Area: Other low back pain [M54.59]    SUBJECTIVE  Pain Level (0-10 scale): 2/10   Any medication changes, allergies to medications, adverse drug reactions, diagnosis change, or new procedure performed?: [x] No    [] Yes (see summary sheet for update)  Subjective functional status/changes:     Pt reports that he has felt good overall. He is not having as much pain and he has been sleeping somewhat better. His child is sleeping better as well so this is helping with his stress level.      OBJECTIVE     43 min Neuromuscular Re-education:  [x]  See flow sheet : introduction of cranial torsion exercises    Rationale: increase ROM, improve coordination, improve balance, and increase proprioception  to improve the patients ability to return to work duty as tolerated     Modality rationale: decrease edema, decrease inflammation, and decrease pain to improve the patients ability to ambulate with full WB on L LE   Min Type Additional Details    [] Estim: []Att   []Unatt        []TENS instruct                  []IFC  []Premod   []NMES                     []Other:  []w/US   []w/ice   []w/heat  Position:  Location:    []  Traction: [] Cervical       []Lumbar                       [] Prone          []Supine                       []Intermittent   []Continuous Lbs:  [] before manual  [] after manual  []w/heat    []  Ultrasound: []Continuous   [] Pulsed at:                            []1MHz   []3MHz Location:  W/cm2:    []  Paraffin         Location:  []w/heat    []  Ice     []  Heat  []  Ice massage Position:  Location:    []  Laser  []  Other: Position:  Location:   15 [x]  Vasopneumatic Device Pressure:       [] lo [x] med [] hi   Temperature: 36   [x] Skin assessment post-treatment:  [x]intact [x]redness- no adverse reaction    []redness - adverse reaction:                With   [] TE   [] TA   [x] Neuro   [] SC   [] other: Patient Education: [x] Review HEP    [x] Progressed/Changed HEP based on:   [x] positioning   [x] body mechanics   [x] transfers   [] heat/ice application    [] other:      Other Objective/Functional Measures:   Limited L cervical rotation  Limited horizontal abd L  Min limited bilateral shoulder flex  + HGIR R  + PADT bilaterally (- following intervention)  + L HADDT (- following intervention)  Apical expansion is good  Squat Level Level 3+ with v.c. minimally  HADLT R 2, L 3 -  NT  V.c. for seated position       Pain Level (0-10 scale) post treatment: 0/10      ASSESSMENT/Changes in Function:     Patient will continue to benefit from skilled PT services to modify and progress therapeutic interventions, address functional mobility deficits, address ROM deficits, address strength deficits, analyze and address soft tissue restrictions, analyze and cue movement patterns, analyze and modify body mechanics/ergonomics, assess and modify postural abnormalities, address imbalance/dizziness, and instruct in home and community integration to attain remaining goals. []  See Plan of Care  []  See progress note/recertification  []  See Discharge Summary         Progress towards goals / Updated goals:  Pt has improved in overall positioning tolerance. He is beginning to understand how to stand and how to sit to promote current POC.       PLAN  []  Upgrade activities as tolerated     [x]  Continue plan of care  [x]  Update interventions per flow sheet       []  Discharge due to:_  []  Other:_       Roseann Gutierrez, PT, DPT, [facility]    1/24/2023

## 2023-01-31 ENCOUNTER — HOSPITAL ENCOUNTER (OUTPATIENT)
Dept: PHYSICAL THERAPY | Age: 33
Discharge: HOME | End: 2023-01-31
Payer: COMMERCIAL

## 2023-01-31 PROCEDURE — 97112 NEUROMUSCULAR REEDUCATION: CPT | Performed by: PHYSICAL THERAPIST

## 2023-01-31 NOTE — PROGRESS NOTES
PT DAILY TREATMENT NOTE 2-15    Patient Name: Oscar Flores  Date:2023  : 1990  [x]  Patient  Verified  Payor: Octaviano Segura / Plan: Marsha Nevarez PPO / Product Type: PPO /    In time: 803 am  Out time: 900 am  Total Treatment Time (min): 57  Visit #:  19    Treatment Area: Other low back pain [M54.59]    SUBJECTIVE  Pain Level (0-10 scale): 0/10   Any medication changes, allergies to medications, adverse drug reactions, diagnosis change, or new procedure performed?: [x] No    [] Yes (see summary sheet for update)  Subjective functional status/changes:     Pt reports that he is doing pretty well. He reports that he is sleeping better overall. He isn't having any real pain much lately and hasn't in 2 weeks. He hasn't had a headache in several weeks.      OBJECTIVE     57 min Neuromuscular Re-education:  [x]  See flow sheet : introduction of cranial torsion exercises    Rationale: increase ROM, improve coordination, improve balance, and increase proprioception  to improve the patients ability to return to work duty as tolerated               With   [] TE   [] TA   [x] Neuro   [] SC   [] other: Patient Education: [x] Review HEP    [x] Progressed/Changed HEP based on:   [x] positioning   [x] body mechanics   [x] transfers   [] heat/ice application    [] other:      Other Objective/Functional Measures:   Limited horizontal abd L  Min limited bilateral shoulder flex  + HGIR R  + PADT L  + L HADDT (- following intervention)  Apical expansion is good  Squat Level Level 3+   HADLT R 2, L 3 -  NT      Pain Level (0-10 scale) post treatment: 0/10      ASSESSMENT/Changes in Function:     Patient will continue to benefit from skilled PT services to modify and progress therapeutic interventions, address functional mobility deficits, address ROM deficits, address strength deficits, analyze and address soft tissue restrictions, analyze and cue movement patterns, analyze and modify body mechanics/ergonomics, assess and modify postural abnormalities, address imbalance/dizziness, and instruct in home and community integration to attain remaining goals. []  See Plan of Care  []  See progress note/recertification  []  See Discharge Summary         Progress towards goals / Updated goals:  Excellent tolerance of today's intervention. Discussed with pt the role that the SSRI is playing in reducing the autonomic overdrive that he was experiencing. He was encouraged to continue with this therapy.        PLAN  []  Upgrade activities as tolerated     [x]  Continue plan of care  [x]  Update interventions per flow sheet       []  Discharge due to:_  []  Other:_       Lucretia Casas, PT, DPT, [facility]    1/31/2023

## 2023-02-17 ENCOUNTER — HOSPITAL ENCOUNTER (OUTPATIENT)
Dept: PHYSICAL THERAPY | Age: 33
Discharge: HOME | End: 2023-02-17
Payer: COMMERCIAL

## 2023-02-17 PROCEDURE — 97112 NEUROMUSCULAR REEDUCATION: CPT | Performed by: PHYSICAL THERAPIST

## 2023-02-17 NOTE — PROGRESS NOTES
PT DAILY TREATMENT NOTE 2-15    Patient Name: Bayron Pinto  Date:2023  : 1990  [x]  Patient  Verified  Payor: Carina Ann / Plan: Arcelia Jacobs PPO / Product Type: PPO /    In time: 182 am  Out time: 1000 am  Total Treatment Time (min): 45  Visit #:  20    Treatment Area: Other low back pain [M54.59]    SUBJECTIVE  Pain Level (0-10 scale): 0/10   Any medication changes, allergies to medications, adverse drug reactions, diagnosis change, or new procedure performed?: [x] No    [] Yes (see summary sheet for update)  Subjective functional status/changes:     Pt reports that he does feel like he overall is doing better. His thoracic spine does seem to get tight sometimes. 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     OBJECTIVE     45 min Neuromuscular Re-education:  [x]  See flow sheet : introduction of cranial torsion exercises    Rationale: increase ROM, improve coordination, improve balance, and increase proprioception  to improve the patients ability to return to work duty as tolerated               With   [] TE   [] TA   [x] Neuro   [] SC   [] other: Patient Education: [x] Review HEP    [x] Progressed/Changed HEP based on:   [x] positioning   [x] body mechanics   [x] transfers   [] heat/ice application    [] other:      Other Objective/Functional Measures:   + horizontal abd L (R is tighter than at previous visit)  Min limited bilateral shoulder flex  + HGIR R  + PADT L  + L HADDT   Apical expansion is good  Squat Level Level 3+ very close to 4  HADLT R 2+, L 3 -        Pain Level (0-10 scale) post treatment: 0/10      ASSESSMENT/Changes in Function:     Patient will continue to benefit from skilled PT services to modify and progress therapeutic interventions, address functional mobility deficits, address ROM deficits, address strength deficits, analyze and address soft tissue restrictions, analyze and cue movement patterns, analyze and modify body mechanics/ergonomics, assess and modify postural abnormalities, address imbalance/dizziness, and instruct in home and community integration to attain remaining goals. []  See Plan of Care  []  See progress note/recertification  []  See Discharge Summary         Progress towards goals / Updated goals:  Pt has current plan of exercises for repositioning when at work. He will use these multiple times through the work day as he starts back next week.       PLAN  []  Upgrade activities as tolerated     [x]  Continue plan of care  [x]  Update interventions per flow sheet       []  Discharge due to:_  []  Other:_       Gregor Cooks, PT, DPT, [facility]    2/17/2023

## 2023-02-22 ENCOUNTER — HOSPITAL ENCOUNTER (OUTPATIENT)
Dept: PHYSICAL THERAPY | Age: 33
Discharge: HOME | End: 2023-02-22
Payer: COMMERCIAL

## 2023-02-22 PROCEDURE — 97112 NEUROMUSCULAR REEDUCATION: CPT | Performed by: PHYSICAL THERAPIST

## 2023-02-22 NOTE — PROGRESS NOTES
PT DAILY TREATMENT NOTE 2-15    Patient Name: Lynne Barrera  Date:2023  : 1990  [x]  Patient  Verified  Payor: Naveen Virk / Plan: Kellie Coronel PPO / Product Type: PPO /    In time: 345 pm  Out time: 425 pm  Total Treatment Time (min): 40  Visit #:  21    Treatment Area: Other low back pain [M54.59]    SUBJECTIVE  Pain Level (0-10 scale): 0/10   Any medication changes, allergies to medications, adverse drug reactions, diagnosis change, or new procedure performed?: [x] No    [] Yes (see summary sheet for update)  Subjective functional status/changes:     Pt reports that he has done fairly well with work. 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 He has tried really hard to maintain his exercises at work. He is going to get new work boots.     OBJECTIVE     40 min Neuromuscular Re-education:  [x]  See flow sheet : introduction of cranial torsion exercises    Rationale: increase ROM, improve coordination, improve balance, and increase proprioception  to improve the patients ability to return to work duty as tolerated               With   [] TE   [] TA   [x] Neuro   [] SC   [] other: Patient Education: [x] Review HEP    [x] Progressed/Changed HEP based on:   [x] positioning   [x] body mechanics   [x] transfers   [] heat/ice application    [] other:      Other Objective/Functional Measures:   - horizontal abd L   Min limited bilateral shoulder flex  + HGIR R  + PADT L  - L HADDT   Apical expansion is good  Squat Level Level  4  HADLT R3, L3         Pain Level (0-10 scale) post treatment: 0/10      ASSESSMENT/Changes in Function:     Patient will continue to benefit from skilled PT services to modify and progress therapeutic interventions, address functional mobility deficits, address ROM deficits, address strength deficits, analyze and address soft tissue restrictions, analyze and cue movement patterns, analyze and modify body mechanics/ergonomics, assess and modify postural abnormalities, address imbalance/dizziness, and instruct in home and community integration to attain remaining goals. []  See Plan of Care  []  See progress note/recertification  []  See Discharge Summary         Progress towards goals / Updated goals:  Pt will cont with current exercises with addition of L gastroc inhibition AR exercise. Will assess at next visit which exercises to progress.       PLAN  []  Upgrade activities as tolerated     [x]  Continue plan of care  [x]  Update interventions per flow sheet       []  Discharge due to:_  []  Other:_       Kelly Schilder, PT, DPT, [facility]    2/22/2023

## 2023-03-01 ENCOUNTER — APPOINTMENT (OUTPATIENT)
Dept: PHYSICAL THERAPY | Age: 33
End: 2023-03-01
Payer: COMMERCIAL

## 2023-03-07 ENCOUNTER — HOSPITAL ENCOUNTER (OUTPATIENT)
Dept: PHYSICAL THERAPY | Age: 33
Discharge: HOME | End: 2023-03-07
Payer: COMMERCIAL

## 2023-03-07 PROCEDURE — 97110 THERAPEUTIC EXERCISES: CPT | Performed by: PHYSICAL THERAPIST

## 2023-03-07 PROCEDURE — 97112 NEUROMUSCULAR REEDUCATION: CPT | Performed by: PHYSICAL THERAPIST

## 2023-03-07 NOTE — PROGRESS NOTES
PT DAILY TREATMENT NOTE 2-15    Patient Name: Alycia Lenz  Date:3/7/2023  : 1990  [x]  Patient  Verified  Payor: Miranda Murry / Plan: Dorma Salines PPO / Product Type: PPO /    In time:  1100 am  Out time: 1200 pm  Total Treatment Time (min): 40  Visit #:  22    Treatment Area: Other low back pain [M54.59]    SUBJECTIVE  Pain Level (0-10 scale): 0/10   Any medication changes, allergies to medications, adverse drug reactions, diagnosis change, or new procedure performed?: [x] No    [] Yes (see summary sheet for update)  Subjective functional status/changes:     Pt reports that he has been now diagnosed with gout. He only has 1 more day of the NSAID and he is feeling a lot better. There is still inflammation but it is better overall.      OBJECTIVE     45 min Neuromuscular Re-education:  [x]  See flow sheet : introduction of cranial torsion exercises    Rationale: increase ROM, improve coordination, improve balance, and increase proprioception  to improve the patients ability to return to work duty as tolerated      15 min Therapeutic Exercise:  [x]  See flow sheet : L toe and ankle exercises for recent gout diagnosis   Rationale: increase ROM, improve coordination, improve balance, and increase proprioception  to improve the patients ability to return to work duty as tolerated             With   [] TE   [] TA   [x] Neuro   [] SC   [] other: Patient Education: [x] Review HEP    [x] Progressed/Changed HEP based on:   [x] positioning   [x] body mechanics   [x] transfers   [] heat/ice application    [] other:      Other Objective/Functional Measures:   - horizontal abd L  - NT  Min limited bilateral shoulder flex  + HGIR bilaterally  + PADT bilaterally  - bilaterally HADDT   Apical expansion is good  Squat Level Level  4 - NT  HADLT R3, L3         Pain Level (0-10 scale) post treatment: 0/10      ASSESSMENT/Changes in Function:     Patient will continue to benefit from skilled PT services to modify and progress therapeutic interventions, address functional mobility deficits, address ROM deficits, address strength deficits, analyze and address soft tissue restrictions, analyze and cue movement patterns, analyze and modify body mechanics/ergonomics, assess and modify postural abnormalities, address imbalance/dizziness, and instruct in home and community integration to attain remaining goals. []  See Plan of Care  []  See progress note/recertification  []  See Discharge Summary         Progress towards goals / Updated goals:  Pt will progress with current adjustments given today. Gout flare is subacute at this time. Will return in 2 weeks and will determine direction of intervention based on inflammatory response noted at this visit.       PLAN  []  Upgrade activities as tolerated     [x]  Continue plan of care  [x]  Update interventions per flow sheet       []  Discharge due to:_  []  Other:_       Emily Fischer, PT, DPT, [facility]    3/7/2023

## 2023-03-24 ENCOUNTER — HOSPITAL ENCOUNTER (OUTPATIENT)
Dept: PHYSICAL THERAPY | Age: 33
Discharge: HOME | End: 2023-03-24
Payer: COMMERCIAL

## 2023-03-24 PROCEDURE — 97112 NEUROMUSCULAR REEDUCATION: CPT | Performed by: PHYSICAL THERAPIST

## 2023-03-24 PROCEDURE — 97110 THERAPEUTIC EXERCISES: CPT | Performed by: PHYSICAL THERAPIST

## 2023-03-24 NOTE — PROGRESS NOTES
PT DAILY TREATMENT NOTE 2-15    Patient Name: Monty Nam  Date:3/24/2023  : 1990  [x]  Patient  Verified  Payor: Jennifer Cheung / Plan: Hiro Serrano PPO / Product Type: PPO /    In time:  1100 am  Out time: 1145 am  Total Treatment Time (min): 45  Visit #:  23 (8 of 27 in total)    Treatment Area: Other low back pain [M54.59]    SUBJECTIVE  Pain Level (0-10 scale): 0/10   Any medication changes, allergies to medications, adverse drug reactions, diagnosis change, or new procedure performed?: [x] No    [] Yes (see summary sheet for update)  Subjective functional status/changes:     Pt reports that he has been feeling pretty good overall even though he is working a lot of days in a row. He has been resting when he is able. His L great toe is feeling pretty good and feels pretty close to baseline.     OBJECTIVE     30 min Neuromuscular Re-education:  [x]  See flow sheet : introduction of cranial torsion exercises    Rationale: increase ROM, improve coordination, improve balance, and increase proprioception  to improve the patients ability to return to work duty as tolerated           15 min Therapeutic exercise:  [x]  See flow sheet : instruction in weightlifting position, posture and exercises to utilize when at work    Rationale: increase ROM, improve coordination, improve balance, and increase proprioception  to improve the patients ability to return to work duty as tolerated          With   [x] TE   [] TA   [x] Neuro   [] SC   [] other: Patient Education: [x] Review HEP    [x] Progressed/Changed HEP based on:   [x] positioning   [x] body mechanics   [x] transfers   [] heat/ice application    [] other:      Other Objective/Functional Measures:   - horizontal abd L   Min limited bilateral shoulder flex  + HGIR bilaterally  + PADT bilaterally - NT  - bilaterally HADDT   Apical expansion is good  Squat Level Level  4  NT  HADLT R3, L3  - NT      Pain Level (0-10 scale) post treatment: 0/10      ASSESSMENT/Changes in Function:     Patient will continue to benefit from skilled PT services to modify and progress therapeutic interventions, address functional mobility deficits, address ROM deficits, address strength deficits, analyze and address soft tissue restrictions, analyze and cue movement patterns, analyze and modify body mechanics/ergonomics, assess and modify postural abnormalities, address imbalance/dizziness, and instruct in home and community integration to attain remaining goals. []  See Plan of Care  []  See progress note/recertification  []  See Discharge Summary         Progress towards goals / Updated goals:  Pt is progressing well at this time. His gout is under control and his gait is almost normalized to baseline.    Full great toe extension is present      PLAN  []  Upgrade activities as tolerated     [x]  Continue plan of care  [x]  Update interventions per flow sheet       []  Discharge due to:_  []  Other:_       Lauryn Pan, PT, DPT, [facility]    3/24/2023

## 2023-04-03 ENCOUNTER — HOSPITAL ENCOUNTER (OUTPATIENT)
Dept: PHYSICAL THERAPY | Age: 33
End: 2023-04-03
Payer: COMMERCIAL

## 2023-04-03 PROCEDURE — 97112 NEUROMUSCULAR REEDUCATION: CPT | Performed by: PHYSICAL THERAPIST

## 2023-04-03 PROCEDURE — 97140 MANUAL THERAPY 1/> REGIONS: CPT | Performed by: PHYSICAL THERAPIST

## 2023-04-03 NOTE — PROGRESS NOTES
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 He did have a air consumption test at work and he made it about 12 minutes and this irritated how he feels and did also feel anxious and want to take off his face mask. He feels like he can't get his body back into neutral as easily as he was able several weeks ago.     OBJECTIVE     Modality rationale: decrease pain and increase tissue extensibility to improve the patients ability to sit and stand without pain   Min Type Additional Details    [] Estim: []Att   []Unatt        []TENS instruct                  []IFC  []Premod   []NMES                     []Other:  []w/US   []w/ice   []w/heat  Position:  Location:    []  Traction: [] Cervical       []Lumbar                       [] Prone          []Supine                       []Intermittent   []Continuous Lbs:  [] before manual  [] after manual  []w/heat    []  Ultrasound: []Continuous   [] Pulsed at:                            []1MHz   []3MHz Location:  W/cm2:    []  Paraffin         Location:  []w/heat   10-15 min (during ther ex) []  Ice     [x]  Heat  []  Ice massage Position: during exercise and breathing  Location: ant chest    []  Laser  []  Other: Position:  Location:    []  Vasopneumatic Device Pressure:       [] lo [] med [] hi Temperature:    [x] Skin assessment post-treatment:  [x]intact [x]redness- no adverse reaction    []redness - adverse reaction:        30 min Neuromuscular Re-education:  [x]  See flow sheet : introduction of cranial torsion exercises    Rationale: increase ROM, improve coordination, improve balance, and increase proprioception  to improve the patients ability to return to work duty as tolerated        15 min Manual Therapy:  L AIC technique, rib pumping, L TMCC manual technique, ice tong technique (all for repositioning, head and neck as well as thorax)   Rationale: increase ROM, improve coordination, improve balance, and increase proprioception  to improve the patients ability to return to work duty as tolerated          With   [x] TE   [] TA   [x] Neuro   [] SC   [] other: Patient Education: [x] Review HEP    [x] Progressed/Changed HEP based on:   [x] positioning   [x] body mechanics   [x] transfers   [] heat/ice application    [] other:      Other Objective/Functional Measures:   horizontal abd L  > R  limited R shoulder flex  + HGIR R  Apical expansion is good  Squat Level Level  4 - NT  HADLT R3, L3  - NT      Pain Level (0-10 scale) post treatment: 0/10      ASSESSMENT/Changes in Function:     Patient will continue to benefit from skilled PT services to modify and progress therapeutic interventions, address functional mobility deficits, address ROM deficits, address strength deficits, analyze and address soft tissue restrictions, analyze and cue movement patterns, analyze and modify body mechanics/ergonomics, assess and modify postural abnormalities, address imbalance/dizziness, and instruct in home and community integration to attain remaining goals. []  See Plan of Care  []  See progress note/recertification  []  See Discharge Summary         Progress towards goals / Updated goals:  Pt is appropriate to complete visits further out with greater emphasis placed on HEP.       PLAN  []  Upgrade activities as tolerated     [x]  Continue plan of care  [x]  Update interventions per flow sheet       []  Discharge due to:_  []  Other:_       Pradip Abebe, PT, DPT, [facility]    4/3/2023

## 2023-04-19 ENCOUNTER — HOSPITAL ENCOUNTER (OUTPATIENT)
Dept: PHYSICAL THERAPY | Age: 33
Discharge: HOME | End: 2023-04-19
Payer: COMMERCIAL

## 2023-04-19 PROCEDURE — 97112 NEUROMUSCULAR REEDUCATION: CPT | Performed by: PHYSICAL THERAPIST

## 2023-04-19 NOTE — PROGRESS NOTES
PT DAILY TREATMENT NOTE 2-15    Patient Name: Rafael Stacy  Date:2023  : 1990  [x]  Patient  Verified  Payor: Maryanne Medel / Plan: Pop Ro PPO / Product Type: PPO /    In time:  1142 am  Out time: 1227 pm  Total Treatment Time (min): 45  Visit #: (10 on current auth; 30 in total)    Treatment Area: Other low back pain [M54.59]    SUBJECTIVE  Pain Level (0-10 scale): 0/10   Any medication changes, allergies to medications, adverse drug reactions, diagnosis change, or new procedure performed?: [x] No    [] Yes (see summary sheet for update)  Subjective functional status/changes:     Pt reports that he overall has been doing pretty well. He hasn't been able to do his exercises as much this week as previously, although he feels fairly good. He has been doing some weight lifting in seated and supine. He feels like this has been pretty well and he hasn't had pain in his back or neck. He has done the [employer] Ladder at work and can do this without pain. The sled is going well also. His L foot is feeling good but not 100%. He does feel like he is going to f/u with a physician regarding his toe.     OBJECTIVE     Modality rationale: decrease pain and increase tissue extensibility to improve the patients ability to sit and stand without pain   Min Type Additional Details    [] Estim: []Att   []Unatt        []TENS instruct                  []IFC  []Premod   []NMES                     []Other:  []w/US   []w/ice   []w/heat  Position:  Location:    []  Traction: [] Cervical       []Lumbar                       [] Prone          []Supine                       []Intermittent   []Continuous Lbs:  [] before manual  [] after manual  []w/heat    []  Ultrasound: []Continuous   [] Pulsed at:                            []1MHz   []3MHz Location:  W/cm2:    []  Paraffin         Location:  []w/heat   10-15 min (during ther ex) []  Ice     [x]  Heat  []  Ice massage Position: during exercise and breathing  Location: ant chest []  Laser  []  Other: Position:  Location:    []  Vasopneumatic Device Pressure:       [] lo [] med [] hi   Temperature:    [x] Skin assessment post-treatment:  [x]intact [x]redness- no adverse reaction    []redness - adverse reaction:        45 min Neuromuscular Re-education:  [x]  See flow sheet : see flow sheet   Rationale: increase ROM, improve coordination, improve balance, and increase proprioception  to improve the patients ability to return to work duty as tolerated        With   [x] TE   [] TA   [x] Neuro   [] SC   [] other: Patient Education: [x] Review HEP    [x] Progressed/Changed HEP based on:   [x] positioning   [x] body mechanics   [x] transfers   [] heat/ice application    [] other:      Other Objective/Functional Measures:   + HGIR R  Apical expansion is good  Squat Level Level 4 with L knee tightness and calf tightness (improved after all 4 exercise)  HADLT R4, L4        Pain Level (0-10 scale) post treatment: 0/10      ASSESSMENT/Changes in Function:     Patient will continue to benefit from skilled PT services to modify and progress therapeutic interventions, address functional mobility deficits, address ROM deficits, address strength deficits, analyze and address soft tissue restrictions, analyze and cue movement patterns, analyze and modify body mechanics/ergonomics, assess and modify postural abnormalities, address imbalance/dizziness, and instruct in home and community integration to attain remaining goals. []  See Plan of Care  []  See progress note/recertification  []  See Discharge Summary         Progress towards goals / Updated goals: Independent with current HEP.       PLAN  []  Upgrade activities as tolerated     [x]  Continue plan of care  [x]  Update interventions per flow sheet       []  Discharge due to:_  []  Other:_       Krista Myrick, PT, DPT, [facility]    4/19/2023

## 2023-05-10 ENCOUNTER — APPOINTMENT (OUTPATIENT)
Dept: PHYSICAL THERAPY | Age: 33
End: 2023-05-10

## 2023-05-10 ENCOUNTER — HOSPITAL ENCOUNTER (OUTPATIENT)
Facility: HOSPITAL | Age: 33
Setting detail: RECURRING SERIES
Discharge: HOME | End: 2023-05-13
Payer: COMMERCIAL

## 2023-05-10 PROCEDURE — 97112 NEUROMUSCULAR REEDUCATION: CPT | Performed by: PHYSICAL THERAPIST

## 2023-05-10 NOTE — PROGRESS NOTES
PHYSICAL THERAPY - DAILY TREATMENT NOTE (updated 3/23)      Date: 5/10/2023          Patient Name:  Jakob Hughes :  1990   Medical   Diagnosis:  Other low back pain [M54.59] Treatment Diagnosis:  M54.2  NECK PAIN and M54.6  THORACIC PAIN    Referral Source:  ELVIA Mathias -* Insurance:   Payor: Ivon Hancock / Plan: Ivon Hancock / Product Type: *No Product type* /                     Patient  verified yes     Visit #   Current  / Total 11 30   Time   In / Out 1103 am 1137 am   Total Treatment Time 34   Total Timed Codes 34         SUBJECTIVE    Pain Level (0-10 scale): 0/10    Any medication changes, allergies to medications, adverse drug reactions, diagnosis change, or new procedure performed?: [x] No    [] Yes (see summary sheet for update)  Medications: Verified on Patient Summary List    Subjective functional status/changes:     Pt reports being tired because his son is teething and he isn't sleeping well. He is doing well at work and has not really had major issues or episodes. He does have some aching in the middle of his back and the base of his neck. He has done well with exercising, running, weightlifting and has been rowing and doing the sled. The rowing has tightened his back but he was easily able to get back into neutral.  He has done well with running and has done some treadmill, some outside and at home he will do on the ground or at [facility]. OBJECTIVE      Therapeutic Procedures: Tx Min Billable or 1:1 Min (if diff from Tx Min) Procedure, Rationale, Specifics   34 34 [id number] Neuromuscular Re-Education (timed):  improve balance, coordination, kinesthetic sense, posture, core stability and proprioception to improve patient's ability to develop conscious control of individual muscles and awareness of position of extremities in order to progress to PLOF and address remaining functional goals.  (see flow sheet as applicable)     Details if applicable:     34 34    Total Total

## 2023-05-11 NOTE — THERAPY DISCHARGE
[address] Kristen [address] [place] [address]  Phone: [phone]    Fax: [phone]    DISCHARGE SUMMARY  Patient Name: Caro Zuluaga : 1990   Treatment/Medical Diagnosis: Other low back pain [M54.59]   Referral Source: ELVIA Carvalho -*     Date of Initial Visit: 2022 Attended Visits: 26 Missed Visits: 2     SUMMARY OF TREATMENT  Manual Therapy, Therapeutic Exercise, Neuromuscular Re-education, MHP, Electrical Stimulation Unattended    CURRENT STATUS    Patient will demonstrate Grade IV or Grade V Hruska Adduction Lift Score to allow for functional mobility in home and community settings  Status at last Eval: not met  Current Status: met  Goal Met?  yes    2. Pt will demonstrate the ability to ambulate forward and backward achieving bilateral Acetabular Femoral Internal Rotation for pain free mobility  Status at last Eval: not met  Current Status: met  Goal Met?  yes    3. Pt will demonstrate the ability to walk and jog without subjective complaints or gait deviation  Status at last Eval: met  Current Status: met  Goal Met?  yes    4. Pt will demonstrate independence with discharge HEP to allow for ambulation, sitting and standing without objective dysfunction   Status at last Eval: met  Current Status: met  Goal Met?  yes    5. 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  yes      RECOMMENDATIONS  Discontinue therapy. Progressing towards or have reached established goals. Lucas Lara, PT, DPT, [facility]         2023       2:58 PM    If you have any questions/comments please contact us directly at [phone]. Thank you for allowing us to assist in the care of your patient.